# Patient Record
Sex: FEMALE | Race: WHITE | NOT HISPANIC OR LATINO
[De-identification: names, ages, dates, MRNs, and addresses within clinical notes are randomized per-mention and may not be internally consistent; named-entity substitution may affect disease eponyms.]

---

## 2017-03-31 ENCOUNTER — APPOINTMENT (OUTPATIENT)
Dept: ENDOCRINOLOGY | Facility: CLINIC | Age: 52
End: 2017-03-31

## 2017-03-31 VITALS
DIASTOLIC BLOOD PRESSURE: 80 MMHG | HEIGHT: 66 IN | BODY MASS INDEX: 37.61 KG/M2 | OXYGEN SATURATION: 97 % | WEIGHT: 234 LBS | SYSTOLIC BLOOD PRESSURE: 120 MMHG | HEART RATE: 81 BPM

## 2017-03-31 DIAGNOSIS — E04.1 NONTOXIC SINGLE THYROID NODULE: ICD-10-CM

## 2017-03-31 DIAGNOSIS — E66.01 MORBID (SEVERE) OBESITY DUE TO EXCESS CALORIES: ICD-10-CM

## 2017-03-31 LAB — HBA1C MFR BLD HPLC: 5.5 %

## 2017-04-04 LAB
25(OH)D3 SERPL-MCNC: 32.6 NG/ML
ALBUMIN SERPL ELPH-MCNC: 4.2 G/DL
ALP BLD-CCNC: 76 U/L
ALT SERPL-CCNC: 28 U/L
ANION GAP SERPL CALC-SCNC: 13 MMOL/L
AST SERPL-CCNC: 21 U/L
BILIRUB SERPL-MCNC: 0.4 MG/DL
BUN SERPL-MCNC: 8 MG/DL
CALCIUM SERPL-MCNC: 9.1 MG/DL
CHLORIDE SERPL-SCNC: 101 MMOL/L
CO2 SERPL-SCNC: 27 MMOL/L
CREAT SERPL-MCNC: 0.6 MG/DL
GLUCOSE SERPL-MCNC: 91 MG/DL
POTASSIUM SERPL-SCNC: 4.2 MMOL/L
PROT SERPL-MCNC: 6.5 G/DL
SODIUM SERPL-SCNC: 141 MMOL/L
T4 FREE SERPL-MCNC: 1 NG/DL
THYROGLOB AB SERPL-ACNC: <20 IU/ML
THYROPEROXIDASE AB SERPL IA-ACNC: 22 IU/ML
TSH SERPL-ACNC: 4.28 UIU/ML

## 2017-07-17 ENCOUNTER — FORM ENCOUNTER (OUTPATIENT)
Age: 52
End: 2017-07-17

## 2017-07-18 ENCOUNTER — APPOINTMENT (OUTPATIENT)
Dept: MAMMOGRAPHY | Facility: CLINIC | Age: 52
End: 2017-07-18

## 2017-07-18 ENCOUNTER — OUTPATIENT (OUTPATIENT)
Dept: OUTPATIENT SERVICES | Facility: HOSPITAL | Age: 52
LOS: 1 days | End: 2017-07-18
Payer: COMMERCIAL

## 2017-07-18 DIAGNOSIS — Z00.8 ENCOUNTER FOR OTHER GENERAL EXAMINATION: ICD-10-CM

## 2017-07-18 PROCEDURE — 77063 BREAST TOMOSYNTHESIS BI: CPT

## 2017-07-18 PROCEDURE — 77067 SCR MAMMO BI INCL CAD: CPT

## 2017-07-31 ENCOUNTER — APPOINTMENT (OUTPATIENT)
Dept: OBGYN | Facility: CLINIC | Age: 52
End: 2017-07-31
Payer: COMMERCIAL

## 2017-07-31 VITALS
DIASTOLIC BLOOD PRESSURE: 70 MMHG | SYSTOLIC BLOOD PRESSURE: 120 MMHG | WEIGHT: 233 LBS | BODY MASS INDEX: 37.45 KG/M2 | HEIGHT: 66 IN

## 2017-07-31 DIAGNOSIS — Z80.49 FAMILY HISTORY OF MALIGNANT NEOPLASM OF OTHER GENITAL ORGANS: ICD-10-CM

## 2017-07-31 DIAGNOSIS — Z80.0 FAMILY HISTORY OF MALIGNANT NEOPLASM OF DIGESTIVE ORGANS: ICD-10-CM

## 2017-07-31 PROCEDURE — 99396 PREV VISIT EST AGE 40-64: CPT

## 2018-07-25 PROBLEM — Z80.0 FAMILY HISTORY OF COLON CANCER: Status: ACTIVE | Noted: 2017-07-31

## 2018-11-07 ENCOUNTER — APPOINTMENT (OUTPATIENT)
Dept: OBGYN | Facility: CLINIC | Age: 53
End: 2018-11-07
Payer: COMMERCIAL

## 2018-11-07 VITALS
DIASTOLIC BLOOD PRESSURE: 70 MMHG | WEIGHT: 230 LBS | HEIGHT: 66 IN | BODY MASS INDEX: 36.96 KG/M2 | SYSTOLIC BLOOD PRESSURE: 130 MMHG

## 2018-11-07 DIAGNOSIS — Z01.419 ENCOUNTER FOR GYNECOLOGICAL EXAMINATION (GENERAL) (ROUTINE) W/OUT ABNORMAL FINDINGS: ICD-10-CM

## 2018-11-07 PROCEDURE — 99396 PREV VISIT EST AGE 40-64: CPT

## 2018-11-07 PROCEDURE — 99213 OFFICE O/P EST LOW 20 MIN: CPT

## 2018-11-08 LAB — HPV HIGH+LOW RISK DNA PNL CVX: NOT DETECTED

## 2018-11-11 ENCOUNTER — FORM ENCOUNTER (OUTPATIENT)
Age: 53
End: 2018-11-11

## 2018-11-12 ENCOUNTER — APPOINTMENT (OUTPATIENT)
Dept: MAMMOGRAPHY | Facility: CLINIC | Age: 53
End: 2018-11-12
Payer: COMMERCIAL

## 2018-11-12 ENCOUNTER — APPOINTMENT (OUTPATIENT)
Dept: ULTRASOUND IMAGING | Facility: CLINIC | Age: 53
End: 2018-11-12
Payer: COMMERCIAL

## 2018-11-12 ENCOUNTER — OUTPATIENT (OUTPATIENT)
Dept: OUTPATIENT SERVICES | Facility: HOSPITAL | Age: 53
LOS: 1 days | End: 2018-11-12
Payer: COMMERCIAL

## 2018-11-12 DIAGNOSIS — Z00.8 ENCOUNTER FOR OTHER GENERAL EXAMINATION: ICD-10-CM

## 2018-11-12 PROCEDURE — 76830 TRANSVAGINAL US NON-OB: CPT | Mod: 26

## 2018-11-12 PROCEDURE — 77067 SCR MAMMO BI INCL CAD: CPT | Mod: 26

## 2018-11-12 PROCEDURE — 76641 ULTRASOUND BREAST COMPLETE: CPT

## 2018-11-12 PROCEDURE — 76856 US EXAM PELVIC COMPLETE: CPT | Mod: 26

## 2018-11-12 PROCEDURE — 77067 SCR MAMMO BI INCL CAD: CPT

## 2018-11-12 PROCEDURE — 76641 ULTRASOUND BREAST COMPLETE: CPT | Mod: 26,50

## 2018-11-12 PROCEDURE — 76830 TRANSVAGINAL US NON-OB: CPT

## 2018-11-12 PROCEDURE — 77063 BREAST TOMOSYNTHESIS BI: CPT | Mod: 26

## 2018-11-12 PROCEDURE — 77063 BREAST TOMOSYNTHESIS BI: CPT

## 2018-11-12 PROCEDURE — 76856 US EXAM PELVIC COMPLETE: CPT

## 2018-11-13 ENCOUNTER — TRANSCRIPTION ENCOUNTER (OUTPATIENT)
Age: 53
End: 2018-11-13

## 2018-11-13 LAB — CYTOLOGY CVX/VAG DOC THIN PREP: NORMAL

## 2018-12-07 ENCOUNTER — APPOINTMENT (OUTPATIENT)
Dept: OBGYN | Facility: CLINIC | Age: 53
End: 2018-12-07
Payer: COMMERCIAL

## 2018-12-07 VITALS — SYSTOLIC BLOOD PRESSURE: 140 MMHG | DIASTOLIC BLOOD PRESSURE: 90 MMHG

## 2018-12-07 DIAGNOSIS — N95.0 POSTMENOPAUSAL BLEEDING: ICD-10-CM

## 2018-12-07 PROCEDURE — 58558Z: CUSTOM

## 2018-12-13 LAB — CORE LAB BIOPSY: NORMAL

## 2019-04-09 ENCOUNTER — APPOINTMENT (OUTPATIENT)
Dept: RHEUMATOLOGY | Facility: CLINIC | Age: 54
End: 2019-04-09
Payer: COMMERCIAL

## 2019-04-09 VITALS
SYSTOLIC BLOOD PRESSURE: 130 MMHG | DIASTOLIC BLOOD PRESSURE: 80 MMHG | HEART RATE: 69 BPM | BODY MASS INDEX: 36.48 KG/M2 | WEIGHT: 227 LBS | HEIGHT: 66 IN | OXYGEN SATURATION: 98 %

## 2019-04-09 DIAGNOSIS — M79.642 PAIN IN RIGHT HAND: ICD-10-CM

## 2019-04-09 DIAGNOSIS — M79.641 PAIN IN RIGHT HAND: ICD-10-CM

## 2019-04-09 DIAGNOSIS — L40.9 PSORIASIS, UNSPECIFIED: ICD-10-CM

## 2019-04-09 PROCEDURE — 99244 OFF/OP CNSLTJ NEW/EST MOD 40: CPT

## 2019-04-10 ENCOUNTER — MEDICATION RENEWAL (OUTPATIENT)
Age: 54
End: 2019-04-10

## 2019-04-10 NOTE — ASSESSMENT
[FreeTextEntry1] : 55 y/o woman with hx including GERD, seasonal allergies, HTN, last seen in our office 3/3/2016 by Dr. Esparza, with hx of psoriasis/psoriatic arthritis, presents for new patient visit.  At that time 3 years ago, she had not started treatment with Dr. Esparza, wanted to implement lifestyle changes.  Her symptoms in joints/tendons have progressed, and she is more open to starting oral treatment which would benefit both her psoriasis and psoriatic arthritis.  \par \par Plan:\par -XRs b/L hands\par -Obtain imaging of right ankle/knee from [facility]. \par -Otezla samples given, and will Rx Otezla 30mg BID with script sent to her specialty pharmacy.  Risks and benefits of otezla discussed, including but not limited to weight loss, GI upset/diarrhea, new or worsening depression/suicidal ideation.  All questions were answered.  \par -baseline labs today, and again at 1 month on otezla.  \par -f/u in 2-3 months

## 2019-04-10 NOTE — HISTORY OF PRESENT ILLNESS
[FreeTextEntry1] : This is a 53 y/o woman with hx of cholecystitis, lap band in 2008, hx of GERD, seasonal allergies, HTN, presents for joint pain, psoriasis rash, and tendon pain.\par \par She has hooks in thumbs, joint pain  in knuckles, wrists, knees, hips, feet, ankles.\par She has been having these pains consistently for 3 years.  \par Her AM stiffness is 4-5 minutes.  Her pain level is 8 on her worst day.  She will occasionally take an advil on bad days.  Currently takes it about 1-2x/week.\par Hasn't seen overt swelling, but feels fullness in hands.  \par She saw Dr. Esparza 3/2016, and had considered treatment then, but preferred natural management. \par \par She tried avoiding certain foods, exercise.  However, things progressed.  \par \par The psoriasis is on her legs/fee.  The psoriasis has overall worsened over time.  \par \par She has nasal congestion.  \par She as hx of heart murmur and PVCs which is why she's on coreg.  She occ has heart racing.  \par She saw a pulmonologist for persistent cough last year, but that is gone.  \par \par No hx of IBD.\par No hx of uveitis/iritis/scleritis\par No hx of depression.\par \par Fam hx + for thyroid CA.\par \par She had an MRI right ankle and right knee [facility].  \par

## 2019-04-10 NOTE — CONSULT LETTER
[Consult Letter:] : I had the pleasure of evaluating your patient, [unfilled]. [Dear  ___] : Dear  [unfilled], [Please see my note below.] : Please see my note below. [Consult Closing:] : Thank you very much for allowing me to participate in the care of this patient.  If you have any questions, please do not hesitate to contact me. [Sincerely,] : Sincerely, [FreeTextEntry3] : Emilia Michael MD

## 2019-04-11 ENCOUNTER — MEDICATION RENEWAL (OUTPATIENT)
Age: 54
End: 2019-04-11

## 2019-07-11 ENCOUNTER — APPOINTMENT (OUTPATIENT)
Dept: RHEUMATOLOGY | Facility: CLINIC | Age: 54
End: 2019-07-11

## 2019-10-25 RX ORDER — AZTREONAM 2 G
1 VIAL (EA) INJECTION
Qty: 0 | Refills: 0 | DISCHARGE
Start: 2019-10-25 | End: 2019-10-29

## 2019-10-27 RX ORDER — CIPROFLOXACIN LACTATE 400MG/40ML
1 VIAL (ML) INTRAVENOUS
Qty: 0 | Refills: 0 | DISCHARGE
Start: 2019-10-27 | End: 2019-11-02

## 2019-10-28 ENCOUNTER — INPATIENT (INPATIENT)
Facility: HOSPITAL | Age: 54
LOS: 4 days | Discharge: HOME | DRG: 871 | End: 2019-11-02
Attending: INTERNAL MEDICINE | Admitting: HOSPITALIST
Payer: COMMERCIAL

## 2019-10-28 VITALS — HEIGHT: 66 IN | WEIGHT: 225.97 LBS

## 2019-10-28 DIAGNOSIS — N39.0 URINARY TRACT INFECTION, SITE NOT SPECIFIED: ICD-10-CM

## 2019-10-28 LAB
ALBUMIN SERPL ELPH-MCNC: 2.5 G/DL — LOW (ref 3.3–5)
ALP SERPL-CCNC: 139 U/L — HIGH (ref 40–120)
ALT FLD-CCNC: 34 U/L — SIGNIFICANT CHANGE UP (ref 12–78)
ANION GAP SERPL CALC-SCNC: 10 MMOL/L — SIGNIFICANT CHANGE UP (ref 5–17)
APPEARANCE UR: ABNORMAL
APTT BLD: 30.2 SEC — SIGNIFICANT CHANGE UP (ref 27.5–36.3)
AST SERPL-CCNC: 18 U/L — SIGNIFICANT CHANGE UP (ref 15–37)
BASOPHILS # BLD AUTO: 0.05 K/UL — SIGNIFICANT CHANGE UP (ref 0–0.2)
BASOPHILS NFR BLD AUTO: 0.3 % — SIGNIFICANT CHANGE UP (ref 0–2)
BILIRUB SERPL-MCNC: 0.8 MG/DL — SIGNIFICANT CHANGE UP (ref 0.2–1.2)
BILIRUB UR-MCNC: NEGATIVE — SIGNIFICANT CHANGE UP
BUN SERPL-MCNC: 33 MG/DL — HIGH (ref 7–23)
CALCIUM SERPL-MCNC: 8.5 MG/DL — SIGNIFICANT CHANGE UP (ref 8.5–10.1)
CHLORIDE SERPL-SCNC: 98 MMOL/L — SIGNIFICANT CHANGE UP (ref 96–108)
CO2 SERPL-SCNC: 24 MMOL/L — SIGNIFICANT CHANGE UP (ref 22–31)
COLOR SPEC: ABNORMAL
CREAT SERPL-MCNC: 1.89 MG/DL — HIGH (ref 0.5–1.3)
DIFF PNL FLD: ABNORMAL
EOSINOPHIL # BLD AUTO: 0.04 K/UL — SIGNIFICANT CHANGE UP (ref 0–0.5)
EOSINOPHIL NFR BLD AUTO: 0.2 % — SIGNIFICANT CHANGE UP (ref 0–6)
GLUCOSE SERPL-MCNC: 100 MG/DL — HIGH (ref 70–99)
GLUCOSE UR QL: NEGATIVE MG/DL — SIGNIFICANT CHANGE UP
HCT VFR BLD CALC: 38.8 % — SIGNIFICANT CHANGE UP (ref 34.5–45)
HGB BLD-MCNC: 13.7 G/DL — SIGNIFICANT CHANGE UP (ref 11.5–15.5)
IMM GRANULOCYTES NFR BLD AUTO: 1.5 % — SIGNIFICANT CHANGE UP (ref 0–1.5)
INR BLD: 1.6 RATIO — HIGH (ref 0.88–1.16)
KETONES UR-MCNC: ABNORMAL
LACTATE SERPL-SCNC: 0.9 MMOL/L — SIGNIFICANT CHANGE UP (ref 0.7–2)
LEUKOCYTE ESTERASE UR-ACNC: ABNORMAL
LYMPHOCYTES # BLD AUTO: 0.91 K/UL — LOW (ref 1–3.3)
LYMPHOCYTES # BLD AUTO: 5.1 % — LOW (ref 13–44)
MCHC RBC-ENTMCNC: 30 PG — SIGNIFICANT CHANGE UP (ref 27–34)
MCHC RBC-ENTMCNC: 35.3 GM/DL — SIGNIFICANT CHANGE UP (ref 32–36)
MCV RBC AUTO: 84.9 FL — SIGNIFICANT CHANGE UP (ref 80–100)
MONOCYTES # BLD AUTO: 1.15 K/UL — HIGH (ref 0–0.9)
MONOCYTES NFR BLD AUTO: 6.5 % — SIGNIFICANT CHANGE UP (ref 2–14)
NEUTROPHILS # BLD AUTO: 15.35 K/UL — HIGH (ref 1.8–7.4)
NEUTROPHILS NFR BLD AUTO: 86.4 % — HIGH (ref 43–77)
NITRITE UR-MCNC: NEGATIVE — SIGNIFICANT CHANGE UP
PH UR: 5 — SIGNIFICANT CHANGE UP (ref 5–8)
PLATELET # BLD AUTO: 203 K/UL — SIGNIFICANT CHANGE UP (ref 150–400)
POTASSIUM SERPL-MCNC: 3.6 MMOL/L — SIGNIFICANT CHANGE UP (ref 3.5–5.3)
POTASSIUM SERPL-SCNC: 3.6 MMOL/L — SIGNIFICANT CHANGE UP (ref 3.5–5.3)
PROT SERPL-MCNC: 7.2 GM/DL — SIGNIFICANT CHANGE UP (ref 6–8.3)
PROT UR-MCNC: 100 MG/DL
PROTHROM AB SERPL-ACNC: 18 SEC — HIGH (ref 10–12.9)
RBC # BLD: 4.57 M/UL — SIGNIFICANT CHANGE UP (ref 3.8–5.2)
RBC # FLD: 11.8 % — SIGNIFICANT CHANGE UP (ref 10.3–14.5)
SODIUM SERPL-SCNC: 132 MMOL/L — LOW (ref 135–145)
SP GR SPEC: 1.01 — SIGNIFICANT CHANGE UP (ref 1.01–1.02)
UROBILINOGEN FLD QL: NEGATIVE MG/DL — SIGNIFICANT CHANGE UP
WBC # BLD: 17.77 K/UL — HIGH (ref 3.8–10.5)
WBC # FLD AUTO: 17.77 K/UL — HIGH (ref 3.8–10.5)

## 2019-10-28 PROCEDURE — 71046 X-RAY EXAM CHEST 2 VIEWS: CPT | Mod: 26

## 2019-10-28 PROCEDURE — 76770 US EXAM ABDO BACK WALL COMP: CPT

## 2019-10-28 PROCEDURE — 74176 CT ABD & PELVIS W/O CONTRAST: CPT

## 2019-10-28 PROCEDURE — 84100 ASSAY OF PHOSPHORUS: CPT

## 2019-10-28 PROCEDURE — 36415 COLL VENOUS BLD VENIPUNCTURE: CPT

## 2019-10-28 PROCEDURE — 83735 ASSAY OF MAGNESIUM: CPT

## 2019-10-28 PROCEDURE — 86803 HEPATITIS C AB TEST: CPT

## 2019-10-28 PROCEDURE — 93010 ELECTROCARDIOGRAM REPORT: CPT

## 2019-10-28 PROCEDURE — 87493 C DIFF AMPLIFIED PROBE: CPT

## 2019-10-28 PROCEDURE — 85652 RBC SED RATE AUTOMATED: CPT

## 2019-10-28 PROCEDURE — 85025 COMPLETE CBC W/AUTO DIFF WBC: CPT

## 2019-10-28 PROCEDURE — 85027 COMPLETE CBC AUTOMATED: CPT

## 2019-10-28 PROCEDURE — 80048 BASIC METABOLIC PNL TOTAL CA: CPT

## 2019-10-28 PROCEDURE — 80061 LIPID PANEL: CPT

## 2019-10-28 PROCEDURE — 87507 IADNA-DNA/RNA PROBE TQ 12-25: CPT

## 2019-10-28 RX ORDER — OMEGA-3 ACID ETHYL ESTERS 1 G
1 CAPSULE ORAL
Qty: 0 | Refills: 0 | DISCHARGE

## 2019-10-28 RX ORDER — ACETAMINOPHEN 500 MG
650 TABLET ORAL ONCE
Refills: 0 | Status: COMPLETED | OUTPATIENT
Start: 2019-10-28 | End: 2019-10-28

## 2019-10-28 RX ORDER — ONDANSETRON 8 MG/1
4 TABLET, FILM COATED ORAL EVERY 6 HOURS
Refills: 0 | Status: DISCONTINUED | OUTPATIENT
Start: 2019-10-28 | End: 2019-11-02

## 2019-10-28 RX ORDER — ACETAMINOPHEN 500 MG
650 TABLET ORAL EVERY 4 HOURS
Refills: 0 | Status: DISCONTINUED | OUTPATIENT
Start: 2019-10-28 | End: 2019-11-02

## 2019-10-28 RX ORDER — LEVOTHYROXINE SODIUM 125 MCG
1 TABLET ORAL
Qty: 0 | Refills: 0 | DISCHARGE

## 2019-10-28 RX ORDER — MONTELUKAST 4 MG/1
1 TABLET, CHEWABLE ORAL
Qty: 0 | Refills: 0 | DISCHARGE

## 2019-10-28 RX ORDER — CARVEDILOL PHOSPHATE 80 MG/1
1 CAPSULE, EXTENDED RELEASE ORAL
Qty: 0 | Refills: 0 | DISCHARGE

## 2019-10-28 RX ORDER — GABAPENTIN 400 MG/1
300 CAPSULE ORAL
Refills: 0 | Status: DISCONTINUED | OUTPATIENT
Start: 2019-10-28 | End: 2019-11-02

## 2019-10-28 RX ORDER — IBUPROFEN 200 MG
600 TABLET ORAL ONCE
Refills: 0 | Status: COMPLETED | OUTPATIENT
Start: 2019-10-28 | End: 2019-10-28

## 2019-10-28 RX ORDER — CEFTRIAXONE 500 MG/1
1000 INJECTION, POWDER, FOR SOLUTION INTRAMUSCULAR; INTRAVENOUS EVERY 24 HOURS
Refills: 0 | Status: DISCONTINUED | OUTPATIENT
Start: 2019-10-29 | End: 2019-10-31

## 2019-10-28 RX ORDER — CEFTRIAXONE 500 MG/1
1000 INJECTION, POWDER, FOR SOLUTION INTRAMUSCULAR; INTRAVENOUS ONCE
Refills: 0 | Status: DISCONTINUED | OUTPATIENT
Start: 2019-10-28 | End: 2019-10-28

## 2019-10-28 RX ORDER — GABAPENTIN 400 MG/1
1 CAPSULE ORAL
Qty: 0 | Refills: 0 | DISCHARGE

## 2019-10-28 RX ORDER — SODIUM CHLORIDE 9 MG/ML
3000 INJECTION INTRAMUSCULAR; INTRAVENOUS; SUBCUTANEOUS ONCE
Refills: 0 | Status: COMPLETED | OUTPATIENT
Start: 2019-10-28 | End: 2019-10-28

## 2019-10-28 RX ORDER — MONTELUKAST 4 MG/1
10 TABLET, CHEWABLE ORAL DAILY
Refills: 0 | Status: DISCONTINUED | OUTPATIENT
Start: 2019-10-28 | End: 2019-11-02

## 2019-10-28 RX ORDER — LEVOTHYROXINE SODIUM 125 MCG
25 TABLET ORAL DAILY
Refills: 0 | Status: DISCONTINUED | OUTPATIENT
Start: 2019-10-28 | End: 2019-11-02

## 2019-10-28 RX ORDER — CARVEDILOL PHOSPHATE 80 MG/1
6.25 CAPSULE, EXTENDED RELEASE ORAL EVERY 12 HOURS
Refills: 0 | Status: DISCONTINUED | OUTPATIENT
Start: 2019-10-28 | End: 2019-11-02

## 2019-10-28 RX ORDER — CEFTRIAXONE 500 MG/1
1000 INJECTION, POWDER, FOR SOLUTION INTRAMUSCULAR; INTRAVENOUS ONCE
Refills: 0 | Status: COMPLETED | OUTPATIENT
Start: 2019-10-28 | End: 2019-10-28

## 2019-10-28 RX ADMIN — SODIUM CHLORIDE 3000 MILLILITER(S): 9 INJECTION INTRAMUSCULAR; INTRAVENOUS; SUBCUTANEOUS at 18:45

## 2019-10-28 RX ADMIN — CEFTRIAXONE 1000 MILLIGRAM(S): 500 INJECTION, POWDER, FOR SOLUTION INTRAMUSCULAR; INTRAVENOUS at 17:44

## 2019-10-28 RX ADMIN — Medication 650 MILLIGRAM(S): at 17:44

## 2019-10-28 RX ADMIN — Medication 600 MILLIGRAM(S): at 17:45

## 2019-10-28 RX ADMIN — SODIUM CHLORIDE 3000 MILLILITER(S): 9 INJECTION INTRAMUSCULAR; INTRAVENOUS; SUBCUTANEOUS at 17:45

## 2019-10-28 NOTE — ED STATDOCS - PROGRESS NOTE DETAILS
Wojciech RODRIGUEZ for ED attending, Dr. Parisi: 53 yo female p/w CC UTI. Pt has been diagnosed with UTI 4 days ago since having +dysuria. Has been prescribed abx and says no relief of sx. Woke up with +fever this morning. Notes +nausea, +diarrhea and +decreased PO intake. Pt is tachy and febrile and failed outpatient UTI tx. Will send pt to main ED for further evaluation.

## 2019-10-28 NOTE — H&P ADULT - NSHPREVIEWOFSYSTEMS_GEN_ALL_CORE
REVIEW OF SYSTEMS:    CONSTITUTIONAL: No weakness, fevers or chills  EYES/ENT: No visual changes;  No vertigo or throat pain   NECK: No pain or stiffness  RESPIRATORY: No cough, wheezing, hemoptysis; No shortness of breath  CARDIOVASCULAR: No chest pain or palpitations  GASTROINTESTINAL: No abdominal or epigastric pain. No nausea, vomiting, or hematemesis; No diarrhea or constipation. No melena or hematochezia.  GENITOURINARY: +dysuria, frequency. No hematuria  NEUROLOGICAL: No numbness or weakness  SKIN: No itching, burning, rashes, or lesions   All other review of systems is negative unless indicated above

## 2019-10-28 NOTE — H&P ADULT - HISTORY OF PRESENT ILLNESS
55 yo F with pmh HTN, hypothyroidism, morbid obesity s/p gastric sleeve, psoriatic arthritis, GERD recently diagnosed with UTI by her PCP 5 days pta and started on bactrim now p/w SP pain, dysuria, fevers to 103.8, rigors and chills. Patient admittedly completed 3 days of bactrim and felt her condition was worsening after fevers developed along with abovementioned symptoms. She returned to PCP and bactrim was rotated to cipro. She completed one full day of cipro. Her PCP advised her to come to ED after she saw no improvement.   +fevers/chills. No flank pain. No hematuria. No N/V however having diffuse watery diarrhea TNTC since yesterday. No abd pain.   WBC 17. Lactate wnl. was cultured and started on ceftriaxone in ED.   She is presently stable and comfortable    Social: social etoh, no tobacco or drugs  Shx: Lap band, cholecystectomy

## 2019-10-28 NOTE — ED PROVIDER NOTE - CARE PLAN
Principal Discharge DX:	Urinary tract infection with hematuria, site unspecified  Secondary Diagnosis:	Fever, unspecified fever cause  Secondary Diagnosis:	Rigors  Secondary Diagnosis:	Sepsis, due to unspecified organism, unspecified whether acute organ dysfunction present

## 2019-10-28 NOTE — ED PROVIDER NOTE - CLINICAL SUMMARY MEDICAL DECISION MAKING FREE TEXT BOX
Fever, chills, rigors, failure to treat with abx, outpatient UTI, at high risk of pyelonephritis, and sepsis. Will admit for abx IV treatment and care. It would be unsafe to dc patient home.

## 2019-10-28 NOTE — ED PROVIDER NOTE - NS_ ATTENDINGSCRIBEDETAILS _ED_A_ED_FT
I Anjel Walden MD saw and examined the patient. Scribe documented for me and under my supervision. I have modified the scribe's documentation where necessary to reflect my history, physical exam and other relevant documentations pertinent to the care of the patient.

## 2019-10-28 NOTE — ED PROVIDER NOTE - OBJECTIVE STATEMENT
53 y/o f presenting to the ED c/o UTI. Pt has diagnosed with UTI x4 days ago since having +dysuria. Has been prescribed abx with no relief of sx. Woke up with +fever this morning. Also notes +nausea, +diarrhea and +decreased PO intake. 55 y/o f with PMHx of HTN presenting to the ED c/o UTI. Pt was diagnosed with UTI x4 days ago by Dr. Samson Hidalgo s/p +dysuria. Has been prescribed Bactrim with no relief of sx, changed to Cipro today with slight relief, recommended she come to ER. Woke up with +fever, +chills this morning. Also notes +nausea, +diarrhea and +decreased PO intake. Nonsmoker, drinks alcohol socially, no recreational drug use. 53 y/o female with PMHx of HTN presenting to the ED c/o UTI. Pt was diagnosed with UTI x4 days ago by Dr. Samson Hidalgo s/p +dysuria. Has been prescribed Bactrim with no relief of sx, changed to Cipro today with slight relief, recommended she come to ER. Woke up with +fever, +chills this morning. Also notes +nausea, +diarrhea and +decreased PO intake. Nonsmoker, drinks alcohol socially, no recreational drug use.

## 2019-10-28 NOTE — ED ADULT TRIAGE NOTE - CHIEF COMPLAINT QUOTE
pt presents to ED c/o back pain, fever, UTI. pt diagnosed w/ UTI Friday and put on bactrim which was then switched to cipro w/ no relief. denies hematuria. TMax 103.8, has not taken tylenol since last night

## 2019-10-28 NOTE — H&P ADULT - NSHPPHYSICALEXAM_GEN_ALL_CORE
ICU Vital Signs Last 24 Hrs  T(C): 36.8 (28 Oct 2019 20:50), Max: 38.8 (28 Oct 2019 16:32)  T(F): 98.3 (28 Oct 2019 20:50), Max: 101.8 (28 Oct 2019 16:32)  HR: 80 (28 Oct 2019 20:50) (80 - 110)  BP: 118/58 (28 Oct 2019 20:50) (100/55 - 148/60)  BP(mean): --  ABP: --  ABP(mean): --  RR: 16 (28 Oct 2019 20:50) (16 - 18)  SpO2: 98% (28 Oct 2019 20:50) (98% - 100%)      PHYSICAL EXAM:    Constitutional: NAD, awake and alert, well-developed  HEENT: PERR, EOMI, Normal Hearing, MMM  Neck: Soft and supple, No LAD, No JVD  Respiratory: Breath sounds are clear bilaterally, No wheezing, rales or rhonchi  Cardiovascular: S1 and S2, regular rate and rhythm, no Murmurs, gallops or rubs  Gastrointestinal: Bowel Sounds present, soft, nontender, nondistended, no guarding, no rebound  : SP on palpations; no flank tenderness  Extremities: No peripheral edema  Vascular: 2+ peripheral pulses  Neurological: A/O x 3, no focal deficits  Musculoskeletal: 5/5 strength b/l upper and lower extremities  Skin: No rashes

## 2019-10-28 NOTE — H&P ADULT - NSHPLABSRESULTS_GEN_ALL_CORE
LABS: All Labs Reviewed:                        13.7   17.77 )-----------( 203      ( 28 Oct 2019 17:39 )             38.8     10-28    132<L>  |  98  |  33<H>  ----------------------------<  100<H>  3.6   |  24  |  1.89<H>    Ca    8.5      28 Oct 2019 17:39    TPro  7.2  /  Alb  2.5<L>  /  TBili  0.8  /  DBili  x   /  AST  18  /  ALT  34  /  AlkPhos  139<H>  10-28    PT/INR - ( 28 Oct 2019 17:39 )   PT: 18.0 sec;   INR: 1.60 ratio         PTT - ( 28 Oct 2019 17:39 )  PTT:30.2 sec          Blood Culture:

## 2019-10-28 NOTE — ED ADULT NURSE NOTE - OBJECTIVE STATEMENT
pt presents to ED c/o UTI. diagnosed a week ago and placed on bactrim and pt states her fever and symptoms became higher, tmax of 103F orally. recently changed meds and placed on cipro. states she has been putting off coming but states nothing has been working. states she was also recently on a prednisone taper pack for swelling in her foot last week. prednisone was finished and foot swelling resolved. ST otm, all other VS WNL. family at bedside. pt able to speak full sentences and ambulate. pt in NAD. RN will ctm

## 2019-10-28 NOTE — ED PROVIDER NOTE - CHPI ED SYMPTOMS POS
DIARRHEA/DECREASED EATING/DRINKING/FEVER/NAUSEA/+dysuria +dysuria, +chills/NAUSEA/DECREASED EATING/DRINKING/DIARRHEA/FEVER

## 2019-10-28 NOTE — ED PROVIDER NOTE - SECONDARY DIAGNOSIS.
Fever, unspecified fever cause Rigors Sepsis, due to unspecified organism, unspecified whether acute organ dysfunction present

## 2019-10-28 NOTE — H&P ADULT - ASSESSMENT
55 yo F with pmh HTN, hypothyroidism, morbid obesity s/p gastric sleeve, psoriatic arthritis, GERD recently diagnosed with UTI by her PCP 5 days pta and started on bactrim now p/w SP pain, dysuria, fevers to 103.8, rigors and chills. Patient admittedly completed 3 days of bactrim and felt her condition was worsening after fevers developed along with abovementioned symptoms. She returned to PCP and bactrim was rotated to cipro. She completed one full day of cipro. Her PCP advised her to come to ED after she saw no improvement.   +fevers/chills. No flank pain. No hematuria. No N/V however having diffuse watery diarrhea TNTC since yesterday. No abd pain.   WBC 17. Lactate wnl. was cultured and started on ceftriaxone in ED.   She is presently stable and comfortable      #Sepsis (POA) 2/2 to UTI - failed outpt treatment  #RICKY/ATN 2/2 to above (unknown baseline)  #Mild Hyponatremia  #diarrhea  -admit to [facility]  -Start ceftriaxone  -Blood / urine cultures  -Anti-pyretics  -NS @ 100 cc/hr  -BMP in am  -If Scr worsen-> renal consult  -Obtain John/UUa - hold hctz/ARB  -renal sono  -Contact isolation  -GI PCR/C diff toxin  DVT px  IMPROVE VTE Individual Risk Assessment    RISK                                                                Points    [  ] Previous VTE                                                  3    [  ] Thrombophilia                                               2    [  ] Lower limb paralysis                                      2        (unable to hold up >15 seconds)      [  ] Current Cancer                                              2         (within 6 months)    [  ] Immobilization > 24 hrs                                1    [  ] ICU/CCU stay > 24 hours                              1    [  ] Age > 60                                                      1    IMPROVE VTE Score ____0_____-> SCDs    IMPROVE Score 0-1: Low Risk, No VTE prophylaxis required for most patients, encourage ambulation.   IMPROVE Score 2-3: At risk, pharmacologic VTE prophylaxis is indicated for most patients (in the absence of a contraindication)  IMPROVE Score > or = 4: High Risk, pharmacologic VTE prophylaxis is indicated for most patients (in the absence of a contraindication)

## 2019-10-29 LAB
ANION GAP SERPL CALC-SCNC: 6 MMOL/L — SIGNIFICANT CHANGE UP (ref 5–17)
BUN SERPL-MCNC: 30 MG/DL — HIGH (ref 7–23)
C DIFF BY PCR RESULT: SIGNIFICANT CHANGE UP
C DIFF TOX GENS STL QL NAA+PROBE: SIGNIFICANT CHANGE UP
CALCIUM SERPL-MCNC: 8.1 MG/DL — LOW (ref 8.5–10.1)
CHLORIDE SERPL-SCNC: 105 MMOL/L — SIGNIFICANT CHANGE UP (ref 96–108)
CO2 SERPL-SCNC: 25 MMOL/L — SIGNIFICANT CHANGE UP (ref 22–31)
CREAT SERPL-MCNC: 1.62 MG/DL — HIGH (ref 0.5–1.3)
CULTURE RESULTS: NO GROWTH — SIGNIFICANT CHANGE UP
CULTURE RESULTS: SIGNIFICANT CHANGE UP
GLUCOSE SERPL-MCNC: 105 MG/DL — HIGH (ref 70–99)
HCT VFR BLD CALC: 36.3 % — SIGNIFICANT CHANGE UP (ref 34.5–45)
HCV AB S/CO SERPL IA: 0.12 S/CO — SIGNIFICANT CHANGE UP (ref 0–0.99)
HCV AB SERPL-IMP: SIGNIFICANT CHANGE UP
HGB BLD-MCNC: 12.4 G/DL — SIGNIFICANT CHANGE UP (ref 11.5–15.5)
MCHC RBC-ENTMCNC: 29.7 PG — SIGNIFICANT CHANGE UP (ref 27–34)
MCHC RBC-ENTMCNC: 34.2 GM/DL — SIGNIFICANT CHANGE UP (ref 32–36)
MCV RBC AUTO: 87.1 FL — SIGNIFICANT CHANGE UP (ref 80–100)
PLATELET # BLD AUTO: 200 K/UL — SIGNIFICANT CHANGE UP (ref 150–400)
POTASSIUM SERPL-MCNC: 3.7 MMOL/L — SIGNIFICANT CHANGE UP (ref 3.5–5.3)
POTASSIUM SERPL-SCNC: 3.7 MMOL/L — SIGNIFICANT CHANGE UP (ref 3.5–5.3)
RBC # BLD: 4.17 M/UL — SIGNIFICANT CHANGE UP (ref 3.8–5.2)
RBC # FLD: 11.9 % — SIGNIFICANT CHANGE UP (ref 10.3–14.5)
SODIUM SERPL-SCNC: 136 MMOL/L — SIGNIFICANT CHANGE UP (ref 135–145)
SPECIMEN SOURCE: SIGNIFICANT CHANGE UP
SPECIMEN SOURCE: SIGNIFICANT CHANGE UP
WBC # BLD: 15.89 K/UL — HIGH (ref 3.8–10.5)
WBC # FLD AUTO: 15.89 K/UL — HIGH (ref 3.8–10.5)

## 2019-10-29 RX ORDER — LACTOBACILLUS ACIDOPHILUS 100MM CELL
1 CAPSULE ORAL
Refills: 0 | Status: DISCONTINUED | OUTPATIENT
Start: 2019-10-29 | End: 2019-11-02

## 2019-10-29 RX ORDER — SODIUM CHLORIDE 9 MG/ML
1000 INJECTION INTRAMUSCULAR; INTRAVENOUS; SUBCUTANEOUS
Refills: 0 | Status: DISCONTINUED | OUTPATIENT
Start: 2019-10-29 | End: 2019-10-30

## 2019-10-29 RX ADMIN — Medication 650 MILLIGRAM(S): at 14:46

## 2019-10-29 RX ADMIN — Medication 650 MILLIGRAM(S): at 15:12

## 2019-10-29 RX ADMIN — Medication 25 MICROGRAM(S): at 06:02

## 2019-10-29 RX ADMIN — Medication 1 TABLET(S): at 15:06

## 2019-10-29 RX ADMIN — CARVEDILOL PHOSPHATE 6.25 MILLIGRAM(S): 80 CAPSULE, EXTENDED RELEASE ORAL at 05:59

## 2019-10-29 RX ADMIN — SODIUM CHLORIDE 75 MILLILITER(S): 9 INJECTION INTRAMUSCULAR; INTRAVENOUS; SUBCUTANEOUS at 15:06

## 2019-10-29 RX ADMIN — MONTELUKAST 10 MILLIGRAM(S): 4 TABLET, CHEWABLE ORAL at 18:13

## 2019-10-29 RX ADMIN — CARVEDILOL PHOSPHATE 6.25 MILLIGRAM(S): 80 CAPSULE, EXTENDED RELEASE ORAL at 18:13

## 2019-10-29 RX ADMIN — CEFTRIAXONE 1000 MILLIGRAM(S): 500 INJECTION, POWDER, FOR SOLUTION INTRAMUSCULAR; INTRAVENOUS at 18:13

## 2019-10-29 RX ADMIN — Medication 650 MILLIGRAM(S): at 22:25

## 2019-10-29 NOTE — PROGRESS NOTE ADULT - ASSESSMENT
#Sepsis (POA) 2/2 to UTI - failed outpt treatment  #RICKY/ATN 2/2 to above (unknown baseline)  #Mild Hyponatremia  #diarrhea  -Start ceftriaxone  -Blood / urine cultures  -Anti-pyretics  -NS @ 100 cc/hr  -BMP in am  -If Scr worsen-> renal consult  -Obtain John/UUa - hold hctz/ARB  -renal sono  -Contact isolation  -GI PCR/C diff toxin    DVT px #Sepsis (POA) 2/2 to UTI - failed outpt treatment  #RICKY/ATN 2/2 to above (unknown baseline)  #Mild Hyponatremia 2ndry to Hypovolemia  #diarrhea mos likely 2ndry to ABX  -GI PCR/C diff toxin  -Start ceftriaxone and Probiotics  -IVF  -FU Blood / urine cultures which will be skewed since patient was on oral ABX prior  -Anti-pyretics  -BMP in am  -Obtain John/UUa - hold hctz/ARB    DVT px

## 2019-10-29 NOTE — PROGRESS NOTE ADULT - SUBJECTIVE AND OBJECTIVE BOX
HOSPITALIST PROGRESS NOTE:  SUBJECTIVE:  PCP:  Chief Complaint: Patient is a 54y old  Female who presents with a chief complaint of fevers, chills rigors (28 Oct 2019 21:18)      HPI:  53 yo F with pmh HTN, hypothyroidism, morbid obesity s/p gastric sleeve, psoriatic arthritis, GERD recently diagnosed with UTI by her PCP 5 days pta and started on bactrim now p/w SP pain, dysuria, fevers to 103.8, rigors and chills. Patient admittedly completed 3 days of bactrim and felt her condition was worsening after fevers developed along with abovementioned symptoms. She returned to PCP and bactrim was rotated to cipro. She completed one full day of cipro. Her PCP advised her to come to ED after she saw no improvement.   +fevers/chills. No flank pain. No hematuria. No N/V however having diffuse watery diarrhea TNTC since yesterday. No abd pain.   WBC 17. Lactate wnl. was cultured and started on ceftriaxone in ED.   She is presently stable and comfortable    10/29:  Above reviewed    Allergies:  No Known Allergies    REVIEW OF SYSTEMS:  See HPI. All other review of systems is negative unless indicated above.     OBJECTIVE  Physical Exam:  Vital Signs:  Height (cm): 167.6 (10- @ 20:50)  Weight (kg): 100.4 (10-28 @ 20:50)  BMI (kg/m2): 35.7 (10- @ 20:50)  BSA (m2): 2.09 (10- @ 20:50)  Vital Signs Last 24 Hrs  T(C): 37.6 (29 Oct 2019 05:25), Max: 38.8 (28 Oct 2019 16:32)  T(F): 99.6 (29 Oct 2019 05:25), Max: 101.8 (28 Oct 2019 16:32)  HR: 98 (29 Oct 2019 05:25) (80 - 110)  BP: 133/45 (29 Oct 2019 05:25) (100/55 - 148/60)  BP(mean): --  RR: 17 (29 Oct 2019 05:25) (16 - 18)  SpO2: 98% (29 Oct 2019 05:25) (98% - 100%)  I&O's Summary      Constitutional: NAD, awake and alert, well-developed  Neurological: AAO x 3, no focal deficits  HEENT: PERRLA, EOMI, MMM  Neck: Soft and supple, No LAD, No JVD  Respiratory: Breath sounds are clear bilaterally, No wheezing, rales or rhonchi  Cardiovascular: S1 and S2, regular rate and rhythm; no Murmurs, gallops or rubs  Gastrointestinal: Bowel Sounds present, soft, nontender, nondistended, no guarding, no rebound tenderness  Back: No CVA tenderness   Extremities: No peripheral edema  Vascular: 2+ peripheral pulses  Musculoskeletal: 5/5 strength b/l upper and lower extremities  Skin: No rashes  Breast: Deferred  Rectal: Deferred    MEDICATIONS  (STANDING):  carvedilol 6.25 milliGRAM(s) Oral every 12 hours  cefTRIAXone Injectable. 1000 milliGRAM(s) IV Push every 24 hours  levothyroxine 25 MICROGram(s) Oral daily  montelukast 10 milliGRAM(s) Oral daily      LABS: All Labs Reviewed:                        12.4   15.89 )-----------( 200      ( 29 Oct 2019 08:16 )             36.3     10-29    136  |  105  |  30<H>  ----------------------------<  105<H>  3.7   |  25  |  1.62<H>    Ca    8.1<L>      29 Oct 2019 08:16    TPro  7.2  /  Alb  2.5<L>  /  TBili  0.8  /  DBili  x   /  AST  18  /  ALT  34  /  AlkPhos  139<H>  10    PT/INR - ( 28 Oct 2019 17:39 )   PT: 18.0 sec;   INR: 1.60 ratio         PTT - ( 28 Oct 2019 17:39 )  PTT:30.2 sec        Urinalysis Basic - ( 28 Oct 2019 17:39 )    Color: Maria Elena / Appearance: Slightly Turbid / S.015 / pH: x  Gluc: x / Ketone: Trace  / Bili: Negative / Urobili: Negative mg/dL   Blood: x / Protein: 100 mg/dL / Nitrite: Negative   Leuk Esterase: Small / RBC: 11-25 /HPF / WBC 11-25   Sq Epi: x / Non Sq Epi: Moderate / Bacteria: Moderate      RADIOLOGY/EKG:    < from: Xray Chest 2 Views PA/Lat (10.28.19 @ 17:48) >    Impression:    No definite focal consolidation    < end of copied text > HOSPITALIST PROGRESS NOTE:  SUBJECTIVE:  PCP:  Chief Complaint: Patient is a 54y old  Female who presents with a chief complaint of fevers, chills rigors (28 Oct 2019 21:18)      HPI:  55 yo F with pmh HTN, hypothyroidism, morbid obesity s/p gastric sleeve, psoriatic arthritis, GERD recently diagnosed with UTI by her PCP 5 days pta and started on bactrim now p/w SP pain, dysuria, fevers to 103.8, rigors and chills. Patient admittedly completed 3 days of bactrim and felt her condition was worsening after fevers developed along with abovementioned symptoms. She returned to PCP and bactrim was rotated to cipro. She completed one full day of cipro. Her PCP advised her to come to ED after she saw no improvement.   +fevers/chills. No flank pain. No hematuria. No N/V however having diffuse watery diarrhea TNTC since yesterday. No abd pain.   WBC 17. Lactate wnl. was cultured and started on ceftriaxone in ED.   She is presently stable and comfortable    10/29:  Above reviewed; Diarrhea less; no abd pain; No overnight events    Allergies:  No Known Allergies    REVIEW OF SYSTEMS:  See HPI. All other review of systems is negative unless indicated above.     OBJECTIVE  Physical Exam:  Vital Signs Last 24 Hrs  T(C): 37.2 (29 Oct 2019 11:12), Max: 38.8 (28 Oct 2019 16:32)  T(F): 98.9 (29 Oct 2019 11:12), Max: 101.8 (28 Oct 2019 16:32)  HR: 87 (29 Oct 2019 11:12) (80 - 110)  BP: 127/53 (29 Oct 2019 11:12) (100/55 - 148/60)  BP(mean): --  RR: 18 (29 Oct 2019 11:12) (16 - 18)  SpO2: 98% (29 Oct 2019 11:12) (98% - 100%)      Constitutional: NAD, awake and alert, Obese   Neurological: AAO x 3, no focal deficits  HEENT: PERRLA, EOMI, MMM  Neck: Soft and supple, No LAD, No JVD  Respiratory: Breath sounds are clear bilaterally, No wheezing, rales or rhonchi  Cardiovascular: S1 and S2, regular rate and rhythm; no Murmurs, gallops or rubs  Gastrointestinal: Bowel Sounds present, soft, nontender, nondistended, no guarding, no rebound tenderness  Back: No CVA tenderness   Extremities: No peripheral edema  Vascular: 2+ peripheral pulses  Musculoskeletal: 5/5 strength b/l upper and lower extremities  Skin: No rashes  Breast: Deferred  Rectal: Deferred    MEDICATIONS  (STANDING):  carvedilol 6.25 milliGRAM(s) Oral every 12 hours  cefTRIAXone Injectable. 1000 milliGRAM(s) IV Push every 24 hours  levothyroxine 25 MICROGram(s) Oral daily  montelukast 10 milliGRAM(s) Oral daily      LABS: All Labs Reviewed:                        12.4   15.89 )-----------( 200      ( 29 Oct 2019 08:16 )             36.3     10-29    136  |  105  |  30<H>  ----------------------------<  105<H>  3.7   |  25  |  1.62<H>    Ca    8.1<L>      29 Oct 2019 08:16    TPro  7.2  /  Alb  2.5<L>  /  TBili  0.8  /  DBili  x   /  AST  18  /  ALT  34  /  AlkPhos  139<H>  10-28    PT/INR - ( 28 Oct 2019 17:39 )   PT: 18.0 sec;   INR: 1.60 ratio         PTT - ( 28 Oct 2019 17:39 )  PTT:30.2 sec        Urinalysis Basic - ( 28 Oct 2019 17:39 )    Color: Maria Elena / Appearance: Slightly Turbid / S.015 / pH: x  Gluc: x / Ketone: Trace  / Bili: Negative / Urobili: Negative mg/dL   Blood: x / Protein: 100 mg/dL / Nitrite: Negative   Leuk Esterase: Small / RBC: 11-25 /HPF / WBC 11-25   Sq Epi: x / Non Sq Epi: Moderate / Bacteria: Moderate      RADIOLOGY/EKG:    < from: Xray Chest 2 Views PA/Lat (10.28.19 @ 17:48) >    Impression:    No definite focal consolidation    < end of copied text >

## 2019-10-29 NOTE — CONSULT NOTE ADULT - SUBJECTIVE AND OBJECTIVE BOX
Patient is a 54y old  Female who presents with a chief complaint of fevers, chills rigors    HPI:  53 y/o Female with h/o HTN, hypothyroidism, morbid obesity s/p gastric sleeve, psoriatic arthritis, GERD recently diagnosed with UTI by her PCP 5 days PTA and treated with oral bactrim, then cipro was admitted on 10/29 for suprapubic pain, dysuria, fevers to 103.8F, rigors and chills. Patient took bactrim for 3 days and felt her condition was worsening with fever. She returned to PCP and was given cipro. She completed one full day of cipro without improvement. Her PCP advised her to come to ED after she saw no improvement.   She has fevers/chills. No flank pain. No hematuria. She has diffuse watery diarrhea since yesterday. No abd pain. In ER she received ceftriaxone.    PMH: as above  Lap band, cholecystectomy  Meds: per reconciliation sheet, noted below  MEDICATIONS  (STANDING):  carvedilol 6.25 milliGRAM(s) Oral every 12 hours  cefTRIAXone Injectable. 1000 milliGRAM(s) IV Push every 24 hours  lactobacillus acidophilus 1 Tablet(s) Oral two times a day  levothyroxine 25 MICROGram(s) Oral daily  montelukast 10 milliGRAM(s) Oral daily  sodium chloride 0.9%. 1000 milliLiter(s) (75 mL/Hr) IV Continuous <Continuous>    MEDICATIONS  (PRN):  acetaminophen   Tablet .. 650 milliGRAM(s) Oral every 4 hours PRN Mild Pain (1 - 3)  aluminum hydroxide/magnesium hydroxide/simethicone Suspension 30 milliLiter(s) Oral every 4 hours PRN Dyspepsia  gabapentin 300 milliGRAM(s) Oral two times a day PRN pain  ondansetron Injectable 4 milliGRAM(s) IV Push every 6 hours PRN Nausea    Allergies    No Known Allergies    Intolerances      Social: no smoking, no alcohol, no illegal drugs; no recent travel, no exposure to TB  FAMILY HISTORY:    no history of premature cardiovascular disease in first degree relatives    ROS: the patient denies fever, no chills, no HA, no seizures, no dizziness, no sore throat, no nasal congestion, no blurry vision, no CP, no palpitations, no SOB, no cough, no abdominal pain, no diarrhea, no N/V, no dysuria, no leg pain, no claudication, no rash, no joint aches, no rectal pain or bleeding, no night sweats  All other systems reviewed and are negative    Vital Signs Last 24 Hrs  T(C): 38.9 (29 Oct 2019 14:45), Max: 38.9 (29 Oct 2019 14:45)  T(F): 102.1 (29 Oct 2019 14:45), Max: 102.1 (29 Oct 2019 14:45)  HR: 87 (29 Oct 2019 11:12) (80 - 110)  BP: 127/53 (29 Oct 2019 11:12) (100/55 - 148/60)  BP(mean): --  RR: 18 (29 Oct 2019 11:12) (16 - 18)  SpO2: 98% (29 Oct 2019 11:12) (98% - 100%)  Daily Height in cm: 167.64 (28 Oct 2019 20:50)    Daily     PE:    Constitutional: frail looking  HEENT: NC/AT, EOMI, PERRLA, conjunctivae clear; ears and nose atraumatic; pharynx benign  Neck: supple; thyroid not palpable  Back: no tenderness  Respiratory: respiratory effort normal; clear to auscultation  Cardiovascular: S1S2 regular, no murmurs  Abdomen: soft, not tender, not distended, positive BS; no liver or spleen organomegaly  Genitourinary: no suprapubic tenderness  Lymphatic: no LN palpable  Musculoskeletal: no muscle tenderness, no joint swelling or tenderness  Extremities: no pedal edema  Neurological/ Psychiatric: AxOx3, judgement and insight normal; moving all extremities  Skin: no rashes; no palpable lesions    Labs: all available labs reviewed                        12.4   15.89 )-----------( 200      ( 29 Oct 2019 08:16 )             36.3     10    136  |  105  |  30<H>  ----------------------------<  105<H>  3.7   |  25  |  1.62<H>    Ca    8.1<L>      29 Oct 2019 08:16    TPro  7.2  /  Alb  2.5<L>  /  TBili  0.8  /  DBili  x   /  AST  18  /  ALT  34  /  AlkPhos  139<H>  10-     LIVER FUNCTIONS - ( 28 Oct 2019 17:39 )  Alb: 2.5 g/dL / Pro: 7.2 gm/dL / ALK PHOS: 139 U/L / ALT: 34 U/L / AST: 18 U/L / GGT: x           Urinalysis Basic - ( 28 Oct 2019 17:39 )    Color: Maria Elena / Appearance: Slightly Turbid / S.015 / pH: x  Gluc: x / Ketone: Trace  / Bili: Negative / Urobili: Negative mg/dL   Blood: x / Protein: 100 mg/dL / Nitrite: Negative   Leuk Esterase: Small / RBC: 11-25 /HPF / WBC 11-25   Sq Epi: x / Non Sq Epi: Moderate / Bacteria: Moderate        Culture Results:   GI PCR Results: NOT detected  *******Please Note:*******  GI panel PCR evaluates for:  Campylobacter, Plesiomonas shigelloides, Salmonella,  Vibrio, Yersinia enterocolitica, Enteroaggregative  Escherichia coli (EAEC), Enteropathogenic E.coli (EPEC),  Enterotoxigenic E. coli (ETEC) lt/st, Shiga-like  toxin-producing E. coli (STEC) stx1/stx2,  Shigella/ Enteroinvasive E. coli (EIEC), Cryptosporidium,  Cyclospora cayetanensis, Entamoeba histolytica,  Giardia lamblia, Adenovirus F 40/41, Astrovirus,  Norovirus GI/GII, Rotavirus A, Sapovirus (10-29 @ 03:15)    Radiology: all available radiological tests reviewed    < from: Xray Chest 2 Views PA/Lat (10.28.19 @ 17:48) >  No definite focal consolidation    < end of copied text >      Advanced directives addressed: full resuscitation Patient is a 54y old  Female who presents with a chief complaint of fevers, chills rigors    HPI:  55 y/o Female with h/o HTN, hypothyroidism, morbid obesity s/p gastric sleeve, psoriatic arthritis, GERD recently diagnosed with UTI by her PCP 5 days PTA and treated with oral bactrim, then cipro was admitted on 10/29 for suprapubic pain, dysuria, fevers to 103.8F, rigors and chills. Patient took bactrim for 3 days and felt her condition was worsening with fever. She returned to PCP and was given cipro. She completed one full day of cipro without improvement. Her PCP advised her to come to ED after she saw no improvement.   She has fevers/chills. She has left flank pain. No hematuria. She has diffuse watery diarrhea since yesterday. No abd pain. In ER she received ceftriaxone.    PMH: as above  Lap band, cholecystectomy  Meds: per reconciliation sheet, noted below  MEDICATIONS  (STANDING):  carvedilol 6.25 milliGRAM(s) Oral every 12 hours  cefTRIAXone Injectable. 1000 milliGRAM(s) IV Push every 24 hours  lactobacillus acidophilus 1 Tablet(s) Oral two times a day  levothyroxine 25 MICROGram(s) Oral daily  montelukast 10 milliGRAM(s) Oral daily  sodium chloride 0.9%. 1000 milliLiter(s) (75 mL/Hr) IV Continuous <Continuous>    MEDICATIONS  (PRN):  acetaminophen   Tablet .. 650 milliGRAM(s) Oral every 4 hours PRN Mild Pain (1 - 3)  aluminum hydroxide/magnesium hydroxide/simethicone Suspension 30 milliLiter(s) Oral every 4 hours PRN Dyspepsia  gabapentin 300 milliGRAM(s) Oral two times a day PRN pain  ondansetron Injectable 4 milliGRAM(s) IV Push every 6 hours PRN Nausea    Allergies    No Known Allergies    Intolerances      Social: no smoking, no alcohol, no illegal drugs; no recent travel, no exposure to TB  FAMILY HISTORY:    no history of premature cardiovascular disease in first degree relatives    ROS: the patient denies HA, no seizures, no dizziness, no sore throat, no nasal congestion, no blurry vision, no CP, no palpitations, no SOB, no cough, no abdominal pain, has diarrhea, no N/V, has dysuria, no leg pain, no claudication, no rash, no joint aches, no rectal pain or bleeding, no night sweats  All other systems reviewed and are negative    Vital Signs Last 24 Hrs  T(C): 38.9 (29 Oct 2019 14:45), Max: 38.9 (29 Oct 2019 14:45)  T(F): 102.1 (29 Oct 2019 14:45), Max: 102.1 (29 Oct 2019 14:45)  HR: 87 (29 Oct 2019 11:12) (80 - 110)  BP: 127/53 (29 Oct 2019 11:12) (100/55 - 148/60)  BP(mean): --  RR: 18 (29 Oct 2019 11:12) (16 - 18)  SpO2: 98% (29 Oct 2019 11:12) (98% - 100%)  Daily Height in cm: 167.64 (28 Oct 2019 20:50)    Daily     PE:    Constitutional: frail looking  HEENT: NC/AT, EOMI, PERRLA, conjunctivae clear; ears and nose atraumatic; pharynx benign  Neck: supple; thyroid not palpable  Back: no tenderness  Respiratory: respiratory effort normal; clear to auscultation  Cardiovascular: S1S2 regular, no murmurs  Abdomen: soft, not tender, not distended, positive BS; no liver or spleen organomegaly  Genitourinary: no suprapubic tenderness; mild left flank pain  Lymphatic: no LN palpable  Musculoskeletal: no muscle tenderness, no joint swelling or tenderness  Extremities: no pedal edema  Neurological/ Psychiatric: AxOx3, judgement and insight normal; moving all extremities  Skin: no rashes; no palpable lesions    Labs: all available labs reviewed                        12.4   15.89 )-----------( 200      ( 29 Oct 2019 08:16 )             36.3     10    136  |  105  |  30<H>  ----------------------------<  105<H>  3.7   |  25  |  1.62<H>    Ca    8.1<L>      29 Oct 2019 08:16    TPro  7.2  /  Alb  2.5<L>  /  TBili  0.8  /  DBili  x   /  AST  18  /  ALT  34  /  AlkPhos  139<H>  10     LIVER FUNCTIONS - ( 28 Oct 2019 17:39 )  Alb: 2.5 g/dL / Pro: 7.2 gm/dL / ALK PHOS: 139 U/L / ALT: 34 U/L / AST: 18 U/L / GGT: x           Urinalysis Basic - ( 28 Oct 2019 17:39 )    Color: Maria Elena / Appearance: Slightly Turbid / S.015 / pH: x  Gluc: x / Ketone: Trace  / Bili: Negative / Urobili: Negative mg/dL   Blood: x / Protein: 100 mg/dL / Nitrite: Negative   Leuk Esterase: Small / RBC: 11-25 /HPF / WBC 11-25   Sq Epi: x / Non Sq Epi: Moderate / Bacteria: Moderate        Culture Results:   GI PCR Results: NOT detected  *******Please Note:*******  GI panel PCR evaluates for:  Campylobacter, Plesiomonas shigelloides, Salmonella,  Vibrio, Yersinia enterocolitica, Enteroaggregative  Escherichia coli (EAEC), Enteropathogenic E.coli (EPEC),  Enterotoxigenic E. coli (ETEC) lt/st, Shiga-like  toxin-producing E. coli (STEC) stx1/stx2,  Shigella/ Enteroinvasive E. coli (EIEC), Cryptosporidium,  Cyclospora cayetanensis, Entamoeba histolytica,  Giardia lamblia, Adenovirus F 40/41, Astrovirus,  Norovirus GI/GII, Rotavirus A, Sapovirus (10-29 @ 03:15)    Radiology: all available radiological tests reviewed    < from: Xray Chest 2 Views PA/Lat (10.28.19 @ 17:48) >  No definite focal consolidation    < end of copied text >      Advanced directives addressed: full resuscitation

## 2019-10-29 NOTE — CONSULT NOTE ADULT - ASSESSMENT
53 y/o Female with h/o HTN, hypothyroidism, morbid obesity s/p gastric sleeve, psoriatic arthritis, GERD recently diagnosed with UTI by her PCP 5 days PTA and treated with oral bactrim, then cipro was admitted on 10/29 for suprapubic pain, dysuria, fevers to 103.8F, rigors and chills. Patient took bactrim for 3 days and felt her condition was worsening with fever. She returned to PCP and was given cipro. She completed one full day of cipro without improvement. Her PCP advised her to come to ED after she saw no improvement.   She has fevers/chills. No flank pain. No hematuria. She has diffuse watery diarrhea since yesterday. No abd pain. In ER she received ceftriaxone.    1. Febrile syndrome. ?cause ?drug fever. Partially treated UTI. Diarrheal syndrome. Possible CDAD. ARF.   -obtain BC x 2, urin c/s  -mild pyuria 55 y/o Female with h/o HTN, hypothyroidism, morbid obesity s/p gastric sleeve, psoriatic arthritis, GERD recently diagnosed with UTI by her PCP 5 days PTA and treated with oral bactrim, then cipro was admitted on 10/29 for suprapubic pain, dysuria, fevers to 103.8F, rigors and chills. Patient took bactrim for 3 days and felt her condition was worsening with fever. She returned to PCP and was given cipro. She completed one full day of cipro without improvement. Her PCP advised her to come to ED after she saw no improvement.   She has fevers/chills. No flank pain. No hematuria. She has diffuse watery diarrhea since yesterday. No abd pain. In ER she received ceftriaxone.    1. Febrile syndrome. ?cause ?drug fever. Partially treated UTI. Diarrheal syndrome. Possible CDAD. ARF.   -leukocyosis  -possible MDRO  -obtain urine culture result from outpatient PMD  -obtain BC x 2, urine c/s  -mild pyuria  -renal US  -obtain stool for CDT  -agree with ceftriaxone 1 gm IV qd for now  -reason for abx use and side effects reviewed with patient; monitor BMP   -contact isolation  -old chart reviewed to assess prior cultures  -monitor temps  -f/u CBC  -supportive care  2. Other issues:   -care per medicine

## 2019-10-29 NOTE — PROVIDER CONTACT NOTE (OTHER) - SITUATION
DR. TEREZA BERNARDO, SPOKE WITH BIANKA FROM MD'S OFFICE. WILL INFORM PCP, PATIENT IS ADMITTED TO . MADE AWARE DR. SIMMONS IS COVERING HOSPITALIST. FAX DISCHARGE PROVIDER NOTE AND SUMMARY -[phone]

## 2019-10-30 LAB
ANION GAP SERPL CALC-SCNC: 4 MMOL/L — LOW (ref 5–17)
BUN SERPL-MCNC: 19 MG/DL — SIGNIFICANT CHANGE UP (ref 7–23)
CALCIUM SERPL-MCNC: 8.2 MG/DL — LOW (ref 8.5–10.1)
CHLORIDE SERPL-SCNC: 110 MMOL/L — HIGH (ref 96–108)
CO2 SERPL-SCNC: 27 MMOL/L — SIGNIFICANT CHANGE UP (ref 22–31)
CREAT SERPL-MCNC: 1.11 MG/DL — SIGNIFICANT CHANGE UP (ref 0.5–1.3)
GLUCOSE SERPL-MCNC: 103 MG/DL — HIGH (ref 70–99)
HCT VFR BLD CALC: 34.4 % — LOW (ref 34.5–45)
HGB BLD-MCNC: 11.7 G/DL — SIGNIFICANT CHANGE UP (ref 11.5–15.5)
MAGNESIUM SERPL-MCNC: 2.5 MG/DL — SIGNIFICANT CHANGE UP (ref 1.6–2.6)
MCHC RBC-ENTMCNC: 29.6 PG — SIGNIFICANT CHANGE UP (ref 27–34)
MCHC RBC-ENTMCNC: 34 GM/DL — SIGNIFICANT CHANGE UP (ref 32–36)
MCV RBC AUTO: 87.1 FL — SIGNIFICANT CHANGE UP (ref 80–100)
PHOSPHATE SERPL-MCNC: 2.2 MG/DL — LOW (ref 2.5–4.5)
PLATELET # BLD AUTO: 229 K/UL — SIGNIFICANT CHANGE UP (ref 150–400)
POTASSIUM SERPL-MCNC: 3.6 MMOL/L — SIGNIFICANT CHANGE UP (ref 3.5–5.3)
POTASSIUM SERPL-SCNC: 3.6 MMOL/L — SIGNIFICANT CHANGE UP (ref 3.5–5.3)
RBC # BLD: 3.95 M/UL — SIGNIFICANT CHANGE UP (ref 3.8–5.2)
RBC # FLD: 12.1 % — SIGNIFICANT CHANGE UP (ref 10.3–14.5)
SODIUM SERPL-SCNC: 141 MMOL/L — SIGNIFICANT CHANGE UP (ref 135–145)
WBC # BLD: 13.4 K/UL — HIGH (ref 3.8–10.5)
WBC # FLD AUTO: 13.4 K/UL — HIGH (ref 3.8–10.5)

## 2019-10-30 PROCEDURE — 76770 US EXAM ABDO BACK WALL COMP: CPT | Mod: 26

## 2019-10-30 RX ORDER — SODIUM,POTASSIUM PHOSPHATES 278-250MG
1 POWDER IN PACKET (EA) ORAL THREE TIMES A DAY
Refills: 0 | Status: COMPLETED | OUTPATIENT
Start: 2019-10-30 | End: 2019-10-31

## 2019-10-30 RX ADMIN — Medication 1 PACKET(S): at 11:14

## 2019-10-30 RX ADMIN — Medication 25 MICROGRAM(S): at 06:23

## 2019-10-30 RX ADMIN — CEFTRIAXONE 1000 MILLIGRAM(S): 500 INJECTION, POWDER, FOR SOLUTION INTRAMUSCULAR; INTRAVENOUS at 18:36

## 2019-10-30 RX ADMIN — CARVEDILOL PHOSPHATE 6.25 MILLIGRAM(S): 80 CAPSULE, EXTENDED RELEASE ORAL at 06:22

## 2019-10-30 RX ADMIN — Medication 650 MILLIGRAM(S): at 22:37

## 2019-10-30 RX ADMIN — CARVEDILOL PHOSPHATE 6.25 MILLIGRAM(S): 80 CAPSULE, EXTENDED RELEASE ORAL at 18:36

## 2019-10-30 RX ADMIN — Medication 650 MILLIGRAM(S): at 15:49

## 2019-10-30 RX ADMIN — Medication 1 PACKET(S): at 21:36

## 2019-10-30 RX ADMIN — Medication 1 TABLET(S): at 18:36

## 2019-10-30 RX ADMIN — Medication 650 MILLIGRAM(S): at 14:38

## 2019-10-30 RX ADMIN — Medication 1 TABLET(S): at 06:23

## 2019-10-30 RX ADMIN — Medication 650 MILLIGRAM(S): at 23:07

## 2019-10-30 RX ADMIN — MONTELUKAST 10 MILLIGRAM(S): 4 TABLET, CHEWABLE ORAL at 11:15

## 2019-10-30 NOTE — PROGRESS NOTE ADULT - ASSESSMENT
#Sepsis (POA) 2/2 to UTI - failed outpt treatment  #RICKY/ATN 2/2 to above (unknown baseline)  #Mild Hyponatremia 2ndry to Hypovolemia  #diarrhea mos likely 2ndry to ABX  -as per the PCP physicians office the urine cx showed Ecoli resistant to bactrim that explains why she was not improving  -please see urine cx in chart  -GI PCR/C diff toxin - Neg  -continue ceftriaxone and Probiotics  -IVF  -FU Blood / urine cultures NGTD which will be skewed since patient was on oral ABX prior  -Anti-pyretics  -BMP in am  - hold hctz/ARB    DVT px    Dispo - paitent came here for Sepsis and RICKY 2/2 to UTI - failed outpt treatment with bactrim and Cipro; as per the PCP urine Cx showed Ecoli resistant to bactrim; Discharge mir If no further Fever; FU with ID

## 2019-10-30 NOTE — PROGRESS NOTE ADULT - SUBJECTIVE AND OBJECTIVE BOX
Date of service: 10-30-19 @ 09:27    Lying in bed in NAD  Feels better   Fever is down Tmax 100.9F    ROS: no fever or chills; denies dizziness, no HA, no SOB or cough, no abdominal pain, no diarrhea or constipation; no legs pain, no rashes    MEDICATIONS  (STANDING):  carvedilol 6.25 milliGRAM(s) Oral every 12 hours  cefTRIAXone Injectable. 1000 milliGRAM(s) IV Push every 24 hours  lactobacillus acidophilus 1 Tablet(s) Oral two times a day  levothyroxine 25 MICROGram(s) Oral daily  montelukast 10 milliGRAM(s) Oral daily  sodium chloride 0.9%. 1000 milliLiter(s) (75 mL/Hr) IV Continuous <Continuous>      Vital Signs Last 24 Hrs  T(C): 37.6 (30 Oct 2019 05:40), Max: 38.9 (29 Oct 2019 14:45)  T(F): 99.7 (30 Oct 2019 05:40), Max: 102.1 (29 Oct 2019 14:45)  HR: 82 (30 Oct 2019 05:40) (82 - 95)  BP: 105/80 (30 Oct 2019 05:40) (105/80 - 138/60)  BP(mean): --  RR: 18 (30 Oct 2019 05:40) (18 - 18)  SpO2: 100% (30 Oct 2019 05:40) (97% - 100%)    Physical Exam:      Constitutional: frail looking  HEENT: NC/AT, EOMI, PERRLA, conjunctivae clear  Neck: supple; thyroid not palpable  Back: no tenderness  Respiratory: respiratory effort normal; clear to auscultation  Cardiovascular: S1S2 regular, no murmurs  Abdomen: soft, not tender, not distended, positive BS  Genitourinary: no suprapubic tenderness; mild left flank pain  Lymphatic: no LN palpable  Musculoskeletal: no muscle tenderness, no joint swelling or tenderness  Extremities: no pedal edema  Neurological/ Psychiatric: AxOx3, moving all extremities  Skin: no rashes; no palpable lesions    Labs: reviewed                        40 )-----------( 229      ( 30 Oct 2019 08:11 )             34.4     10    141  |  110<H>  |  19  ----------------------------<  103<H>  3.6   |  27  |  1.11    Ca    8.2<L>      30 Oct 2019 08:11  Phos  2.2     1030  Mg     2.5     10-30    TPro  7.2  /  Alb  2.5<L>  /  TBili  0.8  /  DBili  x   /  AST  18  /  ALT  34  /  AlkPhos  139<H>  10-28                        12.4   15.89 )-----------( 200      ( 29 Oct 2019 08:16 )             36.3     10-29    136  |  105  |  30<H>  ----------------------------<  105<H>  3.7   |  25  |  1.62<H>    Ca    8.1<L>      29 Oct 2019 08:16    TPro  7.2  /  Alb  2.5<L>  /  TBili  0.8  /  DBili  x   /  AST  18  /  ALT  34  /  AlkPhos  139<H>  10-28     LIVER FUNCTIONS - ( 28 Oct 2019 17:39 )  Alb: 2.5 g/dL / Pro: 7.2 gm/dL / ALK PHOS: 139 U/L / ALT: 34 U/L / AST: 18 U/L / GGT: x           Urinalysis Basic - ( 28 Oct 2019 17:39 )    Color: Maria Elena / Appearance: Slightly Turbid / S.015 / pH: x  Gluc: x / Ketone: Trace  / Bili: Negative / Urobili: Negative mg/dL   Blood: x / Protein: 100 mg/dL / Nitrite: Negative   Leuk Esterase: Small / RBC: 11-25 /HPF / WBC 11-25   Sq Epi: x / Non Sq Epi: Moderate / Bacteria: Moderate      GI PCR Panel, Stool (collected 29 Oct 2019 03:15)  Source: .Stool Feces  Final Report (29 Oct 2019 11:04):    GI PCR Results: NOT detected    *******Please Note:*******    GI panel PCR evaluates for:    Campylobacter, Plesiomonas shigelloides, Salmonella,    Vibrio, Yersinia enterocolitica, Enteroaggregative    Escherichia coli (EAEC), Enteropathogenic E.coli (EPEC),    Enterotoxigenic E. coli (ETEC) lt/st, Shiga-like    toxin-producing E. coli (STEC) stx1/stx2,    Shigella/ Enteroinvasive E. coli (EIEC), Cryptosporidium,    Cyclospora cayetanensis, Entamoeba histolytica,    Giardia lamblia, Adenovirus F 40/41, Astrovirus,    Norovirus GI/GII, Rotavirus A, Sapovirus    Culture - Urine (collected 28 Oct 2019 17:39)  Source: .Urine None  Final Report (29 Oct 2019 19:53):    No growth    Culture - Blood (collected 28 Oct 2019 17:39)  Clostridium difficile Toxin by PCR (10.29.19 @ 00:30)    C Diff by PCR Result: NotDetec    Source: .Blood None  Preliminary Report (30 Oct 2019 02:03):    No growth to date.    Culture - Blood (collected 28 Oct 2019 17:39)  Source: .Blood None  Preliminary Report (30 Oct 2019 02:03):    No growth to date.    Clostridium difficile Toxin by PCR (10.29.19 @ 00:30)    C Diff by PCR Result: NotDetec    Radiology: all available radiological tests reviewed    < from: Xray Chest 2 Views PA/Lat (10.28.19 @ 17:48) >  No definite focal consolidation    < end of copied text >      Advanced directives addressed: full resuscitation

## 2019-10-30 NOTE — PROGRESS NOTE ADULT - SUBJECTIVE AND OBJECTIVE BOX
HOSPITALIST PROGRESS NOTE:  SUBJECTIVE:  PCP:  Chief Complaint: Patient is a 54y old  Female who presents with a chief complaint of fevers, chills rigors (28 Oct 2019 21:18)      HPI:  53 yo F with pmh HTN, hypothyroidism, morbid obesity s/p gastric sleeve, psoriatic arthritis, GERD recently diagnosed with UTI by her PCP 5 days pta and started on bactrim now p/w SP pain, dysuria, fevers to 103.8, rigors and chills. Patient admittedly completed 3 days of bactrim and felt her condition was worsening after fevers developed along with abovementioned symptoms. She returned to PCP and bactrim was rotated to cipro. She completed one full day of cipro. Her PCP advised her to come to ED after she saw no improvement.   +fevers/chills. No flank pain. No hematuria. No N/V however having diffuse watery diarrhea TNTC since yesterday. No abd pain.   WBC 17. Lactate wnl. was cultured and started on ceftriaxone in ED.   She is presently stable and comfortable    10/29:  Above reviewed; Diarrhea less; no abd pain; No overnight events  10/30:  last night had a fever of 100.9; No other complaints     Allergies:  No Known Allergies    REVIEW OF SYSTEMS:  See HPI. All other review of systems is negative unless indicated above.     OBJECTIVE  Physical Exam:  Vital Signs Last 24 Hrs  T(C): 37.7 (30 Oct 2019 12:15), Max: 38.9 (29 Oct 2019 14:45)  T(F): 99.9 (30 Oct 2019 12:15), Max: 102.1 (29 Oct 2019 14:45)  HR: 78 (30 Oct 2019 11:15) (78 - 95)  BP: 127/60 (30 Oct 2019 11:15) (105/80 - 138/60)  BP(mean): --  RR: 18 (30 Oct 2019 11:15) (18 - 18)  SpO2: 98% (30 Oct 2019 11:15) (97% - 100%)    Constitutional: NAD, awake and alert, Obese   Neurological: AAO x 3, no focal deficits  HEENT: PERRLA, EOMI, MMM  Neck: Soft and supple, No LAD, No JVD  Respiratory: Breath sounds are clear bilaterally, No wheezing, rales or rhonchi  Cardiovascular: S1 and S2, regular rate and rhythm; no Murmurs, gallops or rubs  Gastrointestinal: Bowel Sounds present, soft, nontender, nondistended, no guarding, no rebound tenderness  Back: No CVA tenderness   Extremities: No peripheral edema  Vascular: 2+ peripheral pulses  Musculoskeletal: 5/5 strength b/l upper and lower extremities  Skin: No rashes  Breast: Deferred  Rectal: Deferred    MEDICATIONS  (STANDING):  carvedilol 6.25 milliGRAM(s) Oral every 12 hours  cefTRIAXone Injectable. 1000 milliGRAM(s) IV Push every 24 hours  levothyroxine 25 MICROGram(s) Oral daily  montelukast 10 milliGRAM(s) Oral daily    Lab Results:  CBC  CBC Full  -  ( 30 Oct 2019 08:11 )  WBC Count : 13.40 K/uL  RBC Count : 3.95 M/uL  Hemoglobin : 11.7 g/dL  Hematocrit : 34.4 %  Platelet Count - Automated : 229 K/uL  Mean Cell Volume : 87.1 fl  Mean Cell Hemoglobin : 29.6 pg  Mean Cell Hemoglobin Concentration : 34.0 gm/dL  Auto Neutrophil # : x  Auto Lymphocyte # : x  Auto Monocyte # : x  Auto Eosinophil # : x  Auto Basophil # : x  Auto Neutrophil % : x  Auto Lymphocyte % : x  Auto Monocyte % : x  Auto Eosinophil % : x  Auto Basophil % : x    .		Differential:	[] Automated		[] Manual  Chemistry                        11.7   13.40 )-----------( 229      ( 30 Oct 2019 08:11 )             34.4     10-30    141  |  110<H>  |  19  ----------------------------<  103<H>  3.6   |  27  |  1.11    Ca    8.2<L>      30 Oct 2019 08:11  Phos  2.2     10-30  Mg     2.5     10-30    TPro  7.2  /  Alb  2.5<L>  /  TBili  0.8  /  DBili  x   /  AST  18  /  ALT  34  /  AlkPhos  139<H>  10-    LIVER FUNCTIONS - ( 28 Oct 2019 17:39 )  Alb: 2.5 g/dL / Pro: 7.2 gm/dL / ALK PHOS: 139 U/L / ALT: 34 U/L / AST: 18 U/L / GGT: x           PT/INR - ( 28 Oct 2019 17:39 )   PT: 18.0 sec;   INR: 1.60 ratio         PTT - ( 28 Oct 2019 17:39 )  PTT:30.2 sec  Urinalysis Basic - ( 28 Oct 2019 17:39 )    Color: Maria Elena / Appearance: Slightly Turbid / S.015 / pH: x  Gluc: x / Ketone: Trace  / Bili: Negative / Urobili: Negative mg/dL   Blood: x / Protein: 100 mg/dL / Nitrite: Negative   Leuk Esterase: Small / RBC: 11-25 /HPF / WBC 11-25   Sq Epi: x / Non Sq Epi: Moderate / Bacteria: Moderate        MICROBIOLOGY/CULTURES:  Culture Results:   GI PCR Results: NOT detected  *******Please Note:*******  GI panel PCR evaluates for:  Campylobacter, Plesiomonas shigelloides, Salmonella,  Vibrio, Yersinia enterocolitica, Enteroaggregative  Escherichia coli (EAEC), Enteropathogenic E.coli (EPEC),  Enterotoxigenic E. coli (ETEC) lt/st, Shiga-like  toxin-producing E. coli (STEC) stx1/stx2,  Shigella/ Enteroinvasive E. coli (EIEC), Cryptosporidium,  Cyclospora cayetanensis, Entamoeba histolytica,  Giardia lamblia, Adenovirus F 40/41, Astrovirus,  Norovirus GI/GII, Rotavirus A, Sapovirus (10-29 @ 03:15)  Culture Results:   No growth to date. (10-28 @ 17:39)  Culture Results:   No growth to date. (10-28 @ 17:39)  Culture Results:   No growth (10-28 @ 17:39)      Urinalysis Basic - ( 28 Oct 2019 17:39 )    Color: Maria Elena / Appearance: Slightly Turbid / S.015 / pH: x  Gluc: x / Ketone: Trace  / Bili: Negative / Urobili: Negative mg/dL   Blood: x / Protein: 100 mg/dL / Nitrite: Negative   Leuk Esterase: Small / RBC: 11-25 /HPF / WBC 11-25   Sq Epi: x / Non Sq Epi: Moderate / Bacteria: Moderate      RADIOLOGY/EKG:    < from: Xray Chest 2 Views PA/Lat (10.28.19 @ 17:48) >    Impression:    No definite focal consolidation    < end of copied text >

## 2019-10-30 NOTE — PROGRESS NOTE ADULT - ASSESSMENT
53 y/o Female with h/o HTN, hypothyroidism, morbid obesity s/p gastric sleeve, psoriatic arthritis, GERD recently diagnosed with UTI by her PCP 5 days PTA and treated with oral bactrim, then cipro was admitted on 10/29 for suprapubic pain, dysuria, fevers to 103.8F, rigors and chills. Patient took bactrim for 3 days and felt her condition was worsening with fever. She returned to PCP and was given cipro. She completed one full day of cipro without improvement. Her PCP advised her to come to ED after she saw no improvement.   She has fevers/chills. No flank pain. No hematuria. She has diffuse watery diarrhea since yesterday. No abd pain. In ER she received ceftriaxone.    1. Febrile syndrome improving. ?cause ?drug fever. Partially treated UTI. Diarrheal syndrome.  -leukocytosis is improving  -repeat urine culture in  is negative  -obtain urine culture result from outpatient PMD when available  -f/u BC x 2, urine c/s  -renal function is improving  -stool CDT reviewed  -mild pyuria  -renal US  -on ceftriaxone 1 gm IV qd # 2  -tolerating abx well so far; no side effects noted  -continue abx coverage for now  -monitor temps  -f/u CBC  -supportive care  2. Other issues:   -care per medicine

## 2019-10-31 LAB
ADD ON TEST-SPECIMEN IN LAB: SIGNIFICANT CHANGE UP
ANION GAP SERPL CALC-SCNC: 6 MMOL/L — SIGNIFICANT CHANGE UP (ref 5–17)
BUN SERPL-MCNC: 14 MG/DL — SIGNIFICANT CHANGE UP (ref 7–23)
CALCIUM SERPL-MCNC: 8.7 MG/DL — SIGNIFICANT CHANGE UP (ref 8.5–10.1)
CHLORIDE SERPL-SCNC: 110 MMOL/L — HIGH (ref 96–108)
CO2 SERPL-SCNC: 28 MMOL/L — SIGNIFICANT CHANGE UP (ref 22–31)
CREAT SERPL-MCNC: 0.99 MG/DL — SIGNIFICANT CHANGE UP (ref 0.5–1.3)
GLUCOSE SERPL-MCNC: 104 MG/DL — HIGH (ref 70–99)
MAGNESIUM SERPL-MCNC: 2.5 MG/DL — SIGNIFICANT CHANGE UP (ref 1.6–2.6)
PHOSPHATE SERPL-MCNC: 4 MG/DL — SIGNIFICANT CHANGE UP (ref 2.5–4.5)
POTASSIUM SERPL-MCNC: 3.9 MMOL/L — SIGNIFICANT CHANGE UP (ref 3.5–5.3)
POTASSIUM SERPL-SCNC: 3.9 MMOL/L — SIGNIFICANT CHANGE UP (ref 3.5–5.3)
SODIUM SERPL-SCNC: 144 MMOL/L — SIGNIFICANT CHANGE UP (ref 135–145)

## 2019-10-31 PROCEDURE — 74176 CT ABD & PELVIS W/O CONTRAST: CPT | Mod: 26

## 2019-10-31 RX ORDER — CEFTRIAXONE 500 MG/1
1000 INJECTION, POWDER, FOR SOLUTION INTRAMUSCULAR; INTRAVENOUS EVERY 24 HOURS
Refills: 0 | Status: DISCONTINUED | OUTPATIENT
Start: 2019-10-31 | End: 2019-11-02

## 2019-10-31 RX ORDER — CEFTRIAXONE 500 MG/1
1000 INJECTION, POWDER, FOR SOLUTION INTRAMUSCULAR; INTRAVENOUS EVERY 24 HOURS
Refills: 0 | Status: DISCONTINUED | OUTPATIENT
Start: 2019-10-31 | End: 2019-10-31

## 2019-10-31 RX ADMIN — MONTELUKAST 10 MILLIGRAM(S): 4 TABLET, CHEWABLE ORAL at 11:37

## 2019-10-31 RX ADMIN — Medication 1 TABLET(S): at 05:44

## 2019-10-31 RX ADMIN — CARVEDILOL PHOSPHATE 6.25 MILLIGRAM(S): 80 CAPSULE, EXTENDED RELEASE ORAL at 18:17

## 2019-10-31 RX ADMIN — CEFTRIAXONE 1000 MILLIGRAM(S): 500 INJECTION, POWDER, FOR SOLUTION INTRAMUSCULAR; INTRAVENOUS at 18:43

## 2019-10-31 RX ADMIN — Medication 1 TABLET(S): at 18:16

## 2019-10-31 RX ADMIN — CARVEDILOL PHOSPHATE 6.25 MILLIGRAM(S): 80 CAPSULE, EXTENDED RELEASE ORAL at 05:44

## 2019-10-31 RX ADMIN — Medication 25 MICROGRAM(S): at 05:44

## 2019-10-31 RX ADMIN — Medication 1 PACKET(S): at 05:44

## 2019-10-31 NOTE — PROGRESS NOTE ADULT - ASSESSMENT
53 y/o Female with h/o HTN, hypothyroidism, morbid obesity s/p gastric sleeve, psoriatic arthritis, GERD recently diagnosed with UTI by her PCP 5 days PTA and treated with oral bactrim, then cipro was admitted on 10/29 for suprapubic pain, dysuria, fevers to 103.8F, rigors and chills. Patient took bactrim for 3 days and felt her condition was worsening with fever. She returned to PCP and was given cipro. She completed one full day of cipro without improvement. Her PCP advised her to come to ED after she saw no improvement.   She has fevers/chills. No flank pain. No hematuria. She has diffuse watery diarrhea since yesterday. No abd pain. In ER she received ceftriaxone.    1. Febrile syndrome is persistent. ?cause ?drug fever. Partially treated UTI. Diarrheal syndrome resolving.  -mild leukocytosis is improving  -repeat urine culture in  is negative  -urine culture result from outpatient PMD showed E.coli S to cipro/ ceftriaxone and R to bactrim  -f/u BC x 2, urine c/s  -renal function is improving  -stool CDT reviewed  -mild pyuria  -renal US  -on ceftriaxone 1 gm IV qd # 3  -tolerating abx well so far; no side effects noted  -complete abx therapy  -monitor temps  -f/u CBC  -supportive care  2. Other issues:   -care per medicine

## 2019-10-31 NOTE — PROGRESS NOTE ADULT - SUBJECTIVE AND OBJECTIVE BOX
CHIEF COMPLAINT/Diagnosis: sepsis/ UTI/ RICKY    SUBJECTIVE: no complaints; Temp last night 11pm 101.4    REVIEW OF SYSTEMS:    CONSTITUTIONAL: No weakness, fevers or chills  EYES/ENT: No visual changes;  No vertigo or throat pain   NECK: No pain or stiffness  RESPIRATORY: No cough, wheezing, hemoptysis; No shortness of breath  CARDIOVASCULAR: No chest pain or palpitations  GASTROINTESTINAL: No abdominal or epigastric pain. No nausea, vomiting, or hematemesis; No diarrhea or constipation. No melena or hematochezia.  GENITOURINARY: No dysuria, frequency or hematuria  NEUROLOGICAL: No numbness or weakness  SKIN: No itching, burning, rashes, or lesions   All other review of systems is negative unless indicated above    Vital Signs Last 24 Hrs  T(C): 37.1 (31 Oct 2019 05:04), Max: 38.6 (30 Oct 2019 22:37)  T(F): 98.7 (31 Oct 2019 05:04), Max: 101.4 (30 Oct 2019 22:37)  HR: 84 (31 Oct 2019 05:04) (64 - 89)  BP: 141/65 (31 Oct 2019 05:04) (110/83 - 141/65)  BP(mean): --  RR: 18 (31 Oct 2019 05:04) (17 - 18)  SpO2: 99% (31 Oct 2019 05:04) (98% - 100%)    I&O's Summary      CAPILLARY BLOOD GLUCOSE          PHYSICAL EXAM:    Constitutional: NAD, awake and alert, well-developed  HEENT: PERR, EOMI, Normal Hearing, MMM  Neck: Soft and supple, No LAD, No JVD  Respiratory: Breath sounds are clear bilaterally, No wheezing, rales or rhonchi  Cardiovascular: S1 and S2, regular rate and rhythm, no Murmurs, gallops or rubs  Gastrointestinal: Bowel Sounds present, soft, nontender, nondistended, no guarding, no rebound  Extremities: No peripheral edema  Vascular: 2+ peripheral pulses  Neurological: A/O x 3, no focal deficits  Musculoskeletal: 5/5 strength b/l upper and lower extremities  Skin: No rashes    MEDICATIONS:  MEDICATIONS  (STANDING):  carvedilol 6.25 milliGRAM(s) Oral every 12 hours  cefTRIAXone Injectable. 1000 milliGRAM(s) IV Push every 24 hours  lactobacillus acidophilus 1 Tablet(s) Oral two times a day  levothyroxine 25 MICROGram(s) Oral daily  montelukast 10 milliGRAM(s) Oral daily      LABS: All Labs Reviewed:                        11.7   13.40 )-----------( 229      ( 30 Oct 2019 08:11 )             34.4     10-30    141  |  110<H>  |  19  ----------------------------<  103<H>  3.6   |  27  |  1.11    Ca    8.2<L>      30 Oct 2019 08:11  Phos  2.2     10-30  Mg     2.5     10-30            Blood Culture: 10-29 @ 03:15  Organism --  Gram Stain Blood -- Gram Stain --  Specimen Source .Stool Feces  Culture-Blood --    10-28 @ 17:39  Organism --  Gram Stain Blood -- Gram Stain --  Specimen Source .Blood None  Culture-Blood --        Assessment and Plan:       #Sepsis (POA) 2/2 to UTI - failed outpt treatment  #RICKY/ATN 2/2 to above (unknown baseline)  #Mild Hyponatremia 2ndry to Hypovolemia  #diarrhea mos likely 2ndry to ABX  -ongoing Temps 101.4; Send ESR and Lipid panel  -as per the PCP physicians office the urine cx showed Ecoli resistant to bactrim that explains why she was not improving  -please see urine cx in chart  -GI PCR/C diff toxin - Neg  -continue ceftriaxone and Probiotics  -IVF  -FU Blood / urine cultures NGTD which will be skewed since patient was on oral ABX prior  -Anti-pyretics  -BMP in am  - hold hctz/ARB    DVT px    Dispo - paitent came here for Sepsis and RICKY 2/2 to UTI - failed outpt treatment with bactrim and Cipro; as per the PCP urine Cx showed Ecoli resistant to bactrim; ; FU with ID

## 2019-10-31 NOTE — CHART NOTE - NSCHARTNOTEFT_GEN_A_CORE
Was notified by overnight nurse patient had mental status change during the day. Had not notified day time Nurse. Patient seen at bedside. Patient not reporting any present mental status changes or confusion. No changes in vitals signs. Instructed to inform nurse of any new changes over night. Primary team to follow up in the AM.

## 2019-10-31 NOTE — PROGRESS NOTE ADULT - SUBJECTIVE AND OBJECTIVE BOX
Date of service: 10-31-19 @ 10:22    Sitting in bed in NAD  Diarrhea resolved  No dysuria  Febrile to 101.4F yesterday with mild confusion  No new symptoms    ROS: denies dizziness, no HA, no SOB or cough, no abdominal pain, no diarrhea or constipation; no dysuria, no legs pain, no rashes    MEDICATIONS  (STANDING):  carvedilol 6.25 milliGRAM(s) Oral every 12 hours  lactobacillus acidophilus 1 Tablet(s) Oral two times a day  levothyroxine 25 MICROGram(s) Oral daily  montelukast 10 milliGRAM(s) Oral daily      Vital Signs Last 24 Hrs  T(C): 37.1 (31 Oct 2019 05:04), Max: 38.6 (30 Oct 2019 22:37)  T(F): 98.7 (31 Oct 2019 05:04), Max: 101.4 (30 Oct 2019 22:37)  HR: 84 (31 Oct 2019 05:04) (64 - 89)  BP: 141/65 (31 Oct 2019 05:04) (110/83 - 141/65)  BP(mean): --  RR: 18 (31 Oct 2019 05:04) (17 - 18)  SpO2: 99% (31 Oct 2019 05:04) (98% - 100%)    Physical Exam:      Constitutional: frail looking  HEENT: NC/AT, EOMI, PERRLA, conjunctivae clear  Neck: supple; thyroid not palpable  Back: no tenderness  Respiratory: respiratory effort normal; clear to auscultation  Cardiovascular: S1S2 regular, no murmurs  Abdomen: soft, not tender, not distended, positive BS  Genitourinary: no suprapubic tenderness; mild left flank pain  Lymphatic: no LN palpable  Musculoskeletal: no muscle tenderness, no joint swelling or tenderness  Extremities: no pedal edema  Neurological/ Psychiatric: AxOx3, moving all extremities  Skin: no rashes; no palpable lesions    Labs: reviewed                        11.7   13.40 )-----------( 229      ( 30 Oct 2019 08:11 )             34.4     10    141  |  110<H>  |  19  ----------------------------<  103<H>  3.6   |  27  |  1.11    Ca    8.2<L>      30 Oct 2019 08:11  Phos  2.2     10-30  Mg     2.5     10-    TPro  7.2  /  Alb  2.5<L>  /  TBili  0.8  /  DBili  x   /  AST  18  /  ALT  34  /  AlkPhos  139<H>  10-28                        12.4   15.89 )-----------( 200      ( 29 Oct 2019 08:16 )             36.3     10-    136  |  105  |  30<H>  ----------------------------<  105<H>  3.7   |  25  |  1.62<H>    Ca    8.1<L>      29 Oct 2019 08:16    TPro  7.2  /  Alb  2.5<L>  /  TBili  0.8  /  DBili  x   /  AST  18  /  ALT  34  /  AlkPhos  139<H>  10-28     LIVER FUNCTIONS - ( 28 Oct 2019 17:39 )  Alb: 2.5 g/dL / Pro: 7.2 gm/dL / ALK PHOS: 139 U/L / ALT: 34 U/L / AST: 18 U/L / GGT: x           Urinalysis Basic - ( 28 Oct 2019 17:39 )    Color: Maria Elena / Appearance: Slightly Turbid / S.015 / pH: x  Gluc: x / Ketone: Trace  / Bili: Negative / Urobili: Negative mg/dL   Blood: x / Protein: 100 mg/dL / Nitrite: Negative   Leuk Esterase: Small / RBC: 11-25 /HPF / WBC 11-25   Sq Epi: x / Non Sq Epi: Moderate / Bacteria: Moderate      GI PCR Panel, Stool (collected 29 Oct 2019 03:15)  Source: .Stool Feces  Final Report (29 Oct 2019 11:04):    GI PCR Results: NOT detected    *******Please Note:*******    GI panel PCR evaluates for:    Campylobacter, Plesiomonas shigelloides, Salmonella,    Vibrio, Yersinia enterocolitica, Enteroaggregative    Escherichia coli (EAEC), Enteropathogenic E.coli (EPEC),    Enterotoxigenic E. coli (ETEC) lt/st, Shiga-like    toxin-producing E. coli (STEC) stx1/stx2,    Shigella/ Enteroinvasive E. coli (EIEC), Cryptosporidium,    Cyclospora cayetanensis, Entamoeba histolytica,    Giardia lamblia, Adenovirus F 40/41, Astrovirus,    Norovirus GI/GII, Rotavirus A, Sapovirus    Culture - Urine (collected 28 Oct 2019 17:39)  Source: .Urine None  Final Report (29 Oct 2019 19:53):    No growth    Culture - Blood (collected 28 Oct 2019 17:39)  Clostridium difficile Toxin by PCR (10.29.19 @ 00:30)    C Diff by PCR Result: NotDete    Source: .Blood None  Preliminary Report (30 Oct 2019 02:03):    No growth to date.    Culture - Blood (collected 28 Oct 2019 17:39)  Source: .Blood None  Preliminary Report (30 Oct 2019 02:03):    No growth to date.    Clostridium difficile Toxin by PCR (10.29.19 @ 00:30)    [facility]: [facility]    Radiology: all available radiological tests reviewed    < from: Xray Chest 2 Views PA/Lat (10.28.19 @ 17:48) >  No definite focal consolidation    < end of copied text >      Advanced directives addressed: full resuscitation

## 2019-11-01 LAB
ANION GAP SERPL CALC-SCNC: 5 MMOL/L — SIGNIFICANT CHANGE UP (ref 5–17)
BUN SERPL-MCNC: 13 MG/DL — SIGNIFICANT CHANGE UP (ref 7–23)
CALCIUM SERPL-MCNC: 8.7 MG/DL — SIGNIFICANT CHANGE UP (ref 8.5–10.1)
CHLORIDE SERPL-SCNC: 107 MMOL/L — SIGNIFICANT CHANGE UP (ref 96–108)
CO2 SERPL-SCNC: 30 MMOL/L — SIGNIFICANT CHANGE UP (ref 22–31)
CREAT SERPL-MCNC: 1.03 MG/DL — SIGNIFICANT CHANGE UP (ref 0.5–1.3)
GLUCOSE SERPL-MCNC: 106 MG/DL — HIGH (ref 70–99)
HCT VFR BLD CALC: 34.3 % — LOW (ref 34.5–45)
HGB BLD-MCNC: 11.3 G/DL — LOW (ref 11.5–15.5)
MCHC RBC-ENTMCNC: 29.4 PG — SIGNIFICANT CHANGE UP (ref 27–34)
MCHC RBC-ENTMCNC: 32.9 GM/DL — SIGNIFICANT CHANGE UP (ref 32–36)
MCV RBC AUTO: 89.3 FL — SIGNIFICANT CHANGE UP (ref 80–100)
PLATELET # BLD AUTO: 305 K/UL — SIGNIFICANT CHANGE UP (ref 150–400)
POTASSIUM SERPL-MCNC: 4.3 MMOL/L — SIGNIFICANT CHANGE UP (ref 3.5–5.3)
POTASSIUM SERPL-SCNC: 4.3 MMOL/L — SIGNIFICANT CHANGE UP (ref 3.5–5.3)
RBC # BLD: 3.84 M/UL — SIGNIFICANT CHANGE UP (ref 3.8–5.2)
RBC # FLD: 12.4 % — SIGNIFICANT CHANGE UP (ref 10.3–14.5)
SODIUM SERPL-SCNC: 142 MMOL/L — SIGNIFICANT CHANGE UP (ref 135–145)
WBC # BLD: 13.46 K/UL — HIGH (ref 3.8–10.5)
WBC # FLD AUTO: 13.46 K/UL — HIGH (ref 3.8–10.5)

## 2019-11-01 RX ADMIN — CARVEDILOL PHOSPHATE 6.25 MILLIGRAM(S): 80 CAPSULE, EXTENDED RELEASE ORAL at 05:50

## 2019-11-01 RX ADMIN — CEFTRIAXONE 1000 MILLIGRAM(S): 500 INJECTION, POWDER, FOR SOLUTION INTRAMUSCULAR; INTRAVENOUS at 17:07

## 2019-11-01 RX ADMIN — Medication 1 TABLET(S): at 05:49

## 2019-11-01 RX ADMIN — Medication 25 MICROGRAM(S): at 05:49

## 2019-11-01 RX ADMIN — MONTELUKAST 10 MILLIGRAM(S): 4 TABLET, CHEWABLE ORAL at 10:14

## 2019-11-01 RX ADMIN — CARVEDILOL PHOSPHATE 6.25 MILLIGRAM(S): 80 CAPSULE, EXTENDED RELEASE ORAL at 17:07

## 2019-11-01 RX ADMIN — Medication 1 TABLET(S): at 17:07

## 2019-11-01 NOTE — PROGRESS NOTE ADULT - SUBJECTIVE AND OBJECTIVE BOX
Date of service: 19 @ 09:19    Lying in bed in NAD  Fever is down Tmax 100.1F  Denies flank pain  Has urinary frequency    ROS: denies dizziness, no HA, no SOB or cough, no abdominal pain, no diarrhea or constipation; no legs pain, no rashes    MEDICATIONS  (STANDING):  carvedilol 6.25 milliGRAM(s) Oral every 12 hours  cefTRIAXone Injectable. 1000 milliGRAM(s) IV Push every 24 hours  lactobacillus acidophilus 1 Tablet(s) Oral two times a day  levothyroxine 25 MICROGram(s) Oral daily  montelukast 10 milliGRAM(s) Oral daily      Vital Signs Last 24 Hrs  T(C): 37.1 (2019 05:15), Max: 37.8 (31 Oct 2019 16:40)  T(F): 98.8 (2019 05:15), Max: 100.1 (31 Oct 2019 16:40)  HR: 85 (2019 05:15) (78 - 85)  BP: 159/68 (2019 05:15) (141/60 - 159/68)  BP(mean): --  RR: 18 (2019 05:15) (18 - 18)  SpO2: 98% (2019 05:15) (96% - 99%)    Physical Exam:      Constitutional: frail looking  HEENT: NC/AT, EOMI, PERRLA, conjunctivae clear  Neck: supple; thyroid not palpable  Back: no tenderness  Respiratory: respiratory effort normal; clear to auscultation  Cardiovascular: S1S2 regular, no murmurs  Abdomen: soft, not tender, not distended, positive BS  Genitourinary: no suprapubic tenderness; mild left flank pain  Lymphatic: no LN palpable  Musculoskeletal: no muscle tenderness, no joint swelling or tenderness  Extremities: no pedal edema  Neurological/ Psychiatric: AxOx3, moving all extremities  Skin: no rashes; no palpable lesions    Labs: reviewedLabs: reviewed                        11.3   13.46 )-----------( 305      ( 2019 08:15 )             34.3         142  |  107  |  13  ----------------------------<  106<H>  4.3   |  30  |  1.03    Ca    8.7      2019 08:15  Phos  4.0     10-  Mg     2.5     10                        12.4   15.89 )-----------( 200      ( 29 Oct 2019 08:16 )             36.3     10-29    136  |  105  |  30<H>  ----------------------------<  105<H>  3.7   |  25  |  1.62<H>    Ca    8.1<L>      29 Oct 2019 08:16    TPro  7.2  /  Alb  2.5<L>  /  TBili  0.8  /  DBili  x   /  AST  18  /  ALT  34  /  AlkPhos  139<H>  10-28     LIVER FUNCTIONS - ( 28 Oct 2019 17:39 )  Alb: 2.5 g/dL / Pro: 7.2 gm/dL / ALK PHOS: 139 U/L / ALT: 34 U/L / AST: 18 U/L / GGT: x           Urinalysis Basic - ( 28 Oct 2019 17:39 )    Color: Maria Elena / Appearance: Slightly Turbid / S.015 / pH: x  Gluc: x / Ketone: Trace  / Bili: Negative / Urobili: Negative mg/dL   Blood: x / Protein: 100 mg/dL / Nitrite: Negative   Leuk Esterase: Small / RBC: 11-25 /HPF / WBC 11-25   Sq Epi: x / Non Sq Epi: Moderate / Bacteria: Moderate      GI PCR Panel, Stool (collected 29 Oct 2019 03:15)  Source: .Stool Feces  Final Report (29 Oct 2019 11:04):    GI PCR Results: NOT detected    Culture - Urine (collected 28 Oct 2019 17:39)  Source: .Urine None  Final Report (29 Oct 2019 19:53):    No growth    Culture - Blood (collected 28 Oct 2019 17:39)  Clostridium difficile Toxin by PCR (10.29.19 @ 00:30)    C Diff by PCR Result: NotDetec    Source: .Blood None  Preliminary Report (30 Oct 2019 02:03):    No growth to date.    Culture - Blood (collected 28 Oct 2019 17:39)  Source: .Blood None  Preliminary Report (30 Oct 2019 02:03):    No growth to date.    Clostridium difficile Toxin by PCR (10.29.19 @ 00:30)    C Diff by PCR Result: NotDetec    Radiology: all available radiological tests reviewed    < from: Xray Chest 2 Views PA/Lat (10.28.19 @ 17:48) >  No definite focal consolidation    < end of copied text >    < from: CT Abdomen and Pelvis No Cont (10.31.19 @ 11:47) >  No urinary tract calculi or hydronephrosis. Mild bilateral renal   enlargement and edema. Correlate for UTI/pyelonephritis.    < end of copied text >      Advanced directives addressed: full resuscitation

## 2019-11-01 NOTE — PROGRESS NOTE ADULT - ASSESSMENT
55 y/o Female with h/o HTN, hypothyroidism, morbid obesity s/p gastric sleeve, psoriatic arthritis, GERD recently diagnosed with UTI by her PCP 5 days PTA and treated with oral bactrim, then cipro was admitted on 10/29 for suprapubic pain, dysuria, fevers to 103.8F, rigors and chills. Patient took bactrim for 3 days and felt her condition was worsening with fever. She returned to PCP and was given cipro. She completed one full day of cipro without improvement. Her PCP advised her to come to ED after she saw no improvement.   She has fevers/chills. No flank pain. No hematuria. She has diffuse watery diarrhea since yesterday. No abd pain. In ER she received ceftriaxone.    1. Febrile syndrome is persistent, but improving. Partially treated UTI with E.coli. Probable acute pyelonephritis.   -CT abdomen reviewed - suspicion for kidney infection  -leukocytosis is persistent  -repeat urine culture in  is negative  -urine culture result from outpatient PMD showed E.coli S to cipro/ ceftriaxone and R to bactrim  -f/u BC x 2, urine c/s  -renal function is improving  -stool CDT reviewed  -mild pyuria  -renal US  -on ceftriaxone 1 gm IV qd # 4  -tolerating abx well so far; no side effects noted  -continue abx therapy  -monitor temps  -f/u CBC  -supportive care  2. Other issues:   -care per medicine

## 2019-11-01 NOTE — PROGRESS NOTE ADULT - SUBJECTIVE AND OBJECTIVE BOX
SUBJECTIVE: still feels unwell, mild nausea    PHYSICAL EXAM:  Constitutional: NAD, awake and alert, well-developed  HEENT: PERR, EOMI, Normal Hearing, MMM  Neck: Soft and supple, No LAD, No JVD  Respiratory: Breath sounds are clear bilaterally, No wheezing, rales or rhonchi  Cardiovascular: S1 and S2, regular rate and rhythm, no Murmurs, gallops or rubs  Gastrointestinal: Bowel Sounds present, soft, nontender, nondistended, no guarding, no rebound  no CVA tenderness  Extremities: No peripheral edema  Vascular: 2+ peripheral pulses  Neurological: A/O x 3, no focal deficits  Musculoskeletal: 5/5 strength b/l upper and lower extremities  Skin: No rashes    LABS: All Labs Reviewed:                        11.3   13.46 )-----------( 305      ( 01 Nov 2019 08:15 )             34.3     RADIOLOGY/EKG:  < from: CT Abdomen and Pelvis No Cont (10.31.19 @ 11:47) >  No urinary tract calculi or hydronephrosis. Mild bilateral renal   enlargement and edema. Correlate for UTI/pyelonephritis.  < end of copied text >  acetaminophen   Tablet .. 650 milliGRAM(s) Oral every 4 hours PRN  aluminum hydroxide/magnesium hydroxide/simethicone Suspension 30 milliLiter(s) Oral every 4 hours PRN  carvedilol 6.25 milliGRAM(s) Oral every 12 hours  cefTRIAXone Injectable. 1000 milliGRAM(s) IV Push every 24 hours  gabapentin 300 milliGRAM(s) Oral two times a day PRN  lactobacillus acidophilus 1 Tablet(s) Oral two times a day  levothyroxine 25 MICROGram(s) Oral daily  montelukast 10 milliGRAM(s) Oral daily  ondansetron Injectable 4 milliGRAM(s) IV Push every 6 hours PRN

## 2019-11-01 NOTE — PROGRESS NOTE ADULT - ASSESSMENT
#Sepsis (POA) 2/2 to UTI - failed outpt treatment  #RICKY/ATN 2/2 to above (unknown baseline)  #Mild Hyponatremia 2ndry to Hypovolemia  #diarrhea mos likely 2ndry to ABX      Plan:  -as per the PCP physicians office the urine cx showed Ecoli resistant to bactrim that explains why she was not improving  -please see urine cx in chart  -GI PCR/C diff toxin - Neg  -continue ceftriaxone and Probiotics - discussed with ID Dr Whiteside  -FU Blood / urine cultures NGTD which will be skewed since patient was on oral ABX prior  -Anti-pyretics  - hold hctz/ARB    DVT px    Dispo - paitent came here for Sepsis and RICKY 2/2 to UTI - failed outpt treatment with 2 course of antibiotics -  bactrim and Cipro; as per the PCP urine Cx showed Ecoli resistant to bactrim; continue IV antibiotics, dc home tomorrow

## 2019-11-02 ENCOUNTER — TRANSCRIPTION ENCOUNTER (OUTPATIENT)
Age: 54
End: 2019-11-02

## 2019-11-02 VITALS
TEMPERATURE: 98 F | OXYGEN SATURATION: 96 % | HEART RATE: 78 BPM | SYSTOLIC BLOOD PRESSURE: 132 MMHG | RESPIRATION RATE: 17 BRPM | DIASTOLIC BLOOD PRESSURE: 72 MMHG

## 2019-11-02 LAB
BASOPHILS # BLD AUTO: 0 K/UL — SIGNIFICANT CHANGE UP (ref 0–0.2)
BASOPHILS NFR BLD AUTO: 0 % — SIGNIFICANT CHANGE UP (ref 0–2)
EOSINOPHIL # BLD AUTO: 0.13 K/UL — SIGNIFICANT CHANGE UP (ref 0–0.5)
EOSINOPHIL NFR BLD AUTO: 1 % — SIGNIFICANT CHANGE UP (ref 0–6)
HCT VFR BLD CALC: 35 % — SIGNIFICANT CHANGE UP (ref 34.5–45)
HGB BLD-MCNC: 11.4 G/DL — LOW (ref 11.5–15.5)
LYMPHOCYTES # BLD AUTO: 1.38 K/UL — SIGNIFICANT CHANGE UP (ref 1–3.3)
LYMPHOCYTES # BLD AUTO: 11 % — LOW (ref 13–44)
MCHC RBC-ENTMCNC: 29.6 PG — SIGNIFICANT CHANGE UP (ref 27–34)
MCHC RBC-ENTMCNC: 32.6 GM/DL — SIGNIFICANT CHANGE UP (ref 32–36)
MCV RBC AUTO: 90.9 FL — SIGNIFICANT CHANGE UP (ref 80–100)
MONOCYTES # BLD AUTO: 0.75 K/UL — SIGNIFICANT CHANGE UP (ref 0–0.9)
MONOCYTES NFR BLD AUTO: 6 % — SIGNIFICANT CHANGE UP (ref 2–14)
NEUTROPHILS # BLD AUTO: 10.28 K/UL — HIGH (ref 1.8–7.4)
NEUTROPHILS NFR BLD AUTO: 81 % — HIGH (ref 43–77)
NRBC # BLD: SIGNIFICANT CHANGE UP /100 WBCS (ref 0–0)
PLATELET # BLD AUTO: 314 K/UL — SIGNIFICANT CHANGE UP (ref 150–400)
RBC # BLD: 3.85 M/UL — SIGNIFICANT CHANGE UP (ref 3.8–5.2)
RBC # FLD: 12.3 % — SIGNIFICANT CHANGE UP (ref 10.3–14.5)
WBC # BLD: 12.54 K/UL — HIGH (ref 3.8–10.5)
WBC # FLD AUTO: 12.54 K/UL — HIGH (ref 3.8–10.5)

## 2019-11-02 RX ORDER — CEFUROXIME AXETIL 250 MG
1 TABLET ORAL
Qty: 18 | Refills: 0
Start: 2019-11-02 | End: 2019-11-10

## 2019-11-02 RX ADMIN — Medication 25 MICROGRAM(S): at 06:09

## 2019-11-02 RX ADMIN — CARVEDILOL PHOSPHATE 6.25 MILLIGRAM(S): 80 CAPSULE, EXTENDED RELEASE ORAL at 06:09

## 2019-11-02 RX ADMIN — Medication 1 TABLET(S): at 06:09

## 2019-11-02 NOTE — DISCHARGE NOTE NURSING/CASE MANAGEMENT/SOCIAL WORK - PATIENT PORTAL LINK FT
You can access the FollowMyHealth Patient Portal offered by [facility] by registering at the following website: [email or web address]. By joining [facility]’s FollowMyHealth portal, you will also be able to view your health information using other applications (apps) compatible with our system.

## 2019-11-02 NOTE — DISCHARGE NOTE PROVIDER - CARE PROVIDER_API CALL
Sugar Crespo)  Internal Medicine  [address] [place] [address]  Phone: [phone]  Fax: [phone]  Follow Up Time:

## 2019-11-02 NOTE — DISCHARGE NOTE PROVIDER - NSDCMRMEDTOKEN_GEN_ALL_CORE_FT
carvedilol 6.25 mg oral tablet: 1 tab(s) orally 2 times a day  cefuroxime 500 mg oral tablet: 1 tab(s) orally every 12 hours   Fish Oil 1000 mg oral capsule: 1 cap(s) orally 2 times a day  gabapentin 300 mg oral capsule: 1 cap(s) orally 2 times a day, As Needed - for nerve pain  levothyroxine 25 mcg (0.025 mg) oral tablet: 1 tab(s) orally once a day  montelukast 10 mg oral tablet: 1 tab(s) orally once a day  valsartan-hydrochlorothiazide 160mg-12.5mg oral tablet: 1 tab(s) orally once a day

## 2019-11-02 NOTE — DISCHARGE NOTE PROVIDER - HOSPITAL COURSE
55 yo F with pmh HTN, hypothyroidism, morbid obesity s/p gastric sleeve, psoriatic arthritis, GERD recently diagnosed with UTI by her PCP 5 days pta and started on bactrim now p/w SP pain, dysuria, fevers to 103.8, rigors and chills. Patient admittedly completed 3 days of bactrim and felt her condition was worsening after fevers developed along with abovementioned symptoms. She returned to PCP and bactrim was rotated to cipro. She completed one full day of cipro. Her PCP advised her to come to ED after she saw no improvement. Pt had fevers/chills. No flank pain. No hematuria. No N/V however having diffuse watery diarrhea TNTC since yesterday. No abd pain.     WBC 17. Lactate wnl. Pt was admitted for sepsis (POA) 2/2 to UTI - failed outpt treatment on 2 course of antibiotics. Pt also had RICKY/ATN 2/2 to above (unknown baseline) in the setting of sepsis. She had mild Hyponatremia 2ndry to Hypovolemia and diarrhea from ABX    As per the PCP physicians office the urine cx showed Ecoli resistant to bactrim that explains why she was not improving    -GI PCR/C diff toxin - Neg    -continue ceftriaxone and Probiotics - discussed with ID Dr Whiteside - change to po ceftin for nine more     days. -FU Blood / urine cultures NGTD which will be skewed since patient was on oral ABX prior    Can resuem BP meds for HTN.    Pt now afebrile, doing well, WBC improved.     Dispo - paitent came here for Sepsis and RICKY 2/2 to UTI - failed outpt treatment with 2 course of antibiotics -  bactrim and Cipro; as per the PCP urine Cx showed Ecoli resistant to bactrim; stop IV antibiotics, dc home today and f/u with Dr Crespo PMD in 7-10 days.        SUB - improved, no complaints, no dysuria or back pain     PHYSICAL EXAM:    GENERAL: NAD, able to lie flat in bed    HEAD:  Atraumatic, Normocephalic    EYES: EOMI, PERRLA, normal sclera    ENT: Moist mucous membranes    NECK: Supple, No JVD, no nuchal rigidity    CHEST/LUNG: Clear to auscultation bilaterally; No rales, rhonchi, wheezing, or rubs. Unlabored respirations    HEART: Regular rate and rhythm; No murmurs, rubs, or gallops    ABDOMEN: Bowel sounds present; Soft, Nontender, Nondistended. No hepatomegaly    EXTREMITIES:  no pitting bilaterally    NERVOUS SYSTEM:  Alert & Oriented X3, speech clear. No focal motor or sensory deficits    MSK: FROM all 4 extremities, full and equal strength    SKIN: No rashes or lesions    LABS: All Labs Reviewed:                            11.4     12.54 )-----------( 314      ( 02 Nov 2019 07:44 )               35.0     RADIOLOGY/EKG:    < from: CT Abdomen and Pelvis No Cont (10.31.19 @ 11:47) >    No urinary tract calculi or hydronephrosis. Mild bilateral renal     enlargement and edema. Correlate for UTI/pyelonephritis.    < end of copied text >    MED:    acetaminophen   Tablet .. 650 milliGRAM(s) Oral every 4 hours PRN    aluminum hydroxide/magnesium hydroxide/simethicone Suspension 30 milliLiter(s) Oral every 4 hours PRN    carvedilol 6.25 milliGRAM(s) Oral every 12 hours    cefTRIAXone Injectable. 1000 milliGRAM(s) IV Push every 24 hours    gabapentin 300 milliGRAM(s) Oral two times a day PRN    lactobacillus acidophilus 1 Tablet(s) Oral two times a day    levothyroxine 25 MICROGram(s) Oral daily    montelukast 10 milliGRAM(s) Oral daily    ondansetron Injectable 4 milliGRAM(s) IV Push every 6 hours PRN    Time spent on discharge - 50 mins    discussed with RN and Dr Whiteside

## 2019-11-02 NOTE — PROGRESS NOTE ADULT - NSHPATTENDINGPLANDISCUSS_GEN_ALL_CORE
lisa at bedside and Dr. Yang
patient, nursing, staff
Patient, nurse
Patient, nurse
daughter at bedside
lisa at bedside and Dr. Yang
lisa at bedside and Dr. Yang
patient, nursing, staff

## 2019-11-02 NOTE — PROGRESS NOTE ADULT - PROVIDER SPECIALTY LIST ADULT
Hospitalist
Infectious Disease

## 2019-11-02 NOTE — PROGRESS NOTE ADULT - ASSESSMENT
55 y/o Female with h/o HTN, hypothyroidism, morbid obesity s/p gastric sleeve, psoriatic arthritis, GERD recently diagnosed with UTI by her PCP 5 days PTA and treated with oral bactrim, then cipro was admitted on 10/29 for suprapubic pain, dysuria, fevers to 103.8F, rigors and chills. Patient took bactrim for 3 days and felt her condition was worsening with fever. She returned to PCP and was given cipro. She completed one full day of cipro without improvement. Her PCP advised her to come to ED after she saw no improvement.   She has fevers/chills. No flank pain. No hematuria. She has diffuse watery diarrhea since yesterday. No abd pain. In ER she received ceftriaxone.    1. Febrile syndrome is improving. Partially treated UTI with E.coli. Probable acute pyelonephritis.   -CT abdomen reviewed - suspicion for kidney infection  -leukocytosis is persistent  -repeat urine culture in  is negative  -urine culture result from outpatient PMD showed E.coli S to cipro/ ceftriaxone and R to bactrim  -f/u BC x 2, urine c/s  -renal function is improving  -stool CDT reviewed  -on ceftriaxone 1 gm IV qd # 5  -tolerating abx well so far; no side effects noted  -may change abx to ceftin 500 mg PO q12h for 10 more days  -monitor temps  -f/u CBC  -supportive care  2. Other issues:   -care per medicine

## 2019-11-02 NOTE — PROGRESS NOTE ADULT - REASON FOR ADMISSION
fevers, chills rigors

## 2019-11-02 NOTE — PROGRESS NOTE ADULT - SUBJECTIVE AND OBJECTIVE BOX
Date of service: 19 @ 08:23    Lying in bed in NAD  Fever is down  Tmax 99.6F  Denies pain    ROS: denies dizziness, no HA, no SOB or cough, no abdominal pain, no diarrhea or constipation; no dysuria, no legs pain, no rashes    MEDICATIONS  (STANDING):  carvedilol 6.25 milliGRAM(s) Oral every 12 hours  cefTRIAXone Injectable. 1000 milliGRAM(s) IV Push every 24 hours  lactobacillus acidophilus 1 Tablet(s) Oral two times a day  levothyroxine 25 MICROGram(s) Oral daily  montelukast 10 milliGRAM(s) Oral daily      Vital Signs Last 24 Hrs  T(C): 37.5 (2019 05:59), Max: 37.6 (2019 00:09)  T(F): 99.5 (2019 05:59), Max: 99.6 (2019 00:09)  HR: 93 (2019 05:59) (59 - 93)  BP: 145/59 (2019 05:59) (134/64 - 157/73)  BP(mean): --  RR: 18 (2019 05:59) (18 - 18)  SpO2: 97% (2019 05:59) (97% - 99%)    Physical Exam:      Constitutional: frail looking  HEENT: NC/AT, EOMI, PERRLA, conjunctivae clear  Neck: supple; thyroid not palpable  Back: no tenderness  Respiratory: respiratory effort normal; clear to auscultation  Cardiovascular: S1S2 regular, no murmurs  Abdomen: soft, not tender, not distended, positive BS  Genitourinary: no suprapubic tenderness; mild left flank pain  Lymphatic: no LN palpable  Musculoskeletal: no muscle tenderness, no joint swelling or tenderness  Extremities: no pedal edema  Neurological/ Psychiatric: AxOx3, moving all extremities  Skin: no rashes; no palpable lesions    Labs: reviewed                        11.3   13.46 )-----------( 305      ( 2019 08:15 )             34.3         142  |  107  |  13  ----------------------------<  106<H>  4.3   |  30  |  1.03    Ca    8.7      2019 08:15  Phos  4.0     10-  Mg     2.5     10                        12.4   15.89 )-----------( 200      ( 29 Oct 2019 08:16 )             36.3     10-29    136  |  105  |  30<H>  ----------------------------<  105<H>  3.7   |  25  |  1.62<H>    Ca    8.1<L>      29 Oct 2019 08:16    TPro  7.2  /  Alb  2.5<L>  /  TBili  0.8  /  DBili  x   /  AST  18  /  ALT  34  /  AlkPhos  139<H>  10     LIVER FUNCTIONS - ( 28 Oct 2019 17:39 )  Alb: 2.5 g/dL / Pro: 7.2 gm/dL / ALK PHOS: 139 U/L / ALT: 34 U/L / AST: 18 U/L / GGT: x           Urinalysis Basic - ( 28 Oct 2019 17:39 )    Color: Maria Elena / Appearance: Slightly Turbid / S.015 / pH: x  Gluc: x / Ketone: Trace  / Bili: Negative / Urobili: Negative mg/dL   Blood: x / Protein: 100 mg/dL / Nitrite: Negative   Leuk Esterase: Small / RBC: 11-25 /HPF / WBC 11-25   Sq Epi: x / Non Sq Epi: Moderate / Bacteria: Moderate      GI PCR Panel, Stool (collected 29 Oct 2019 03:15)  Source: .Stool Feces  Final Report (29 Oct 2019 11:04):    GI PCR Results: NOT detected    Culture - Urine (collected 28 Oct 2019 17:39)  Source: .Urine None  Final Report (29 Oct 2019 19:53):    No growth    Culture - Blood (collected 28 Oct 2019 17:39)  Clostridium difficile Toxin by PCR (10.29.19 @ 00:30)    C Diff by PCR Result: NotDetec    Source: .Blood None  Preliminary Report (30 Oct 2019 02:03):    No growth to date.    Culture - Blood (collected 28 Oct 2019 17:39)  Source: .Blood None  Preliminary Report (30 Oct 2019 02:03):    No growth to date.    Clostridium difficile Toxin by PCR (10.29.19 @ 00:30)    C Diff by PCR Result: NotDetec    Radiology: all available radiological tests reviewed    < from: Xray Chest 2 Views PA/Lat (10.28.19 @ 17:48) >  No definite focal consolidation    < end of copied text >    < from: CT Abdomen and Pelvis No Cont (10.31.19 @ 11:47) >  No urinary tract calculi or hydronephrosis. Mild bilateral renal   enlargement and edema. Correlate for UTI/pyelonephritis.    < end of copied text >      Advanced directives addressed: full resuscitation

## 2019-11-02 NOTE — DISCHARGE NOTE PROVIDER - NSDCCPCAREPLAN_GEN_ALL_CORE_FT
PRINCIPAL DISCHARGE DIAGNOSIS  Diagnosis: Urinary tract infection with hematuria, site unspecified  Assessment and Plan of Treatment:       SECONDARY DISCHARGE DIAGNOSES  Diagnosis: Sepsis, due to unspecified organism, unspecified whether acute organ dysfunction present  Assessment and Plan of Treatment:     Diagnosis: Rigors  Assessment and Plan of Treatment:     Diagnosis: Fever, unspecified fever cause  Assessment and Plan of Treatment:

## 2019-11-03 LAB
CULTURE RESULTS: SIGNIFICANT CHANGE UP
CULTURE RESULTS: SIGNIFICANT CHANGE UP
SPECIMEN SOURCE: SIGNIFICANT CHANGE UP
SPECIMEN SOURCE: SIGNIFICANT CHANGE UP

## 2019-11-06 DIAGNOSIS — Z90.49 ACQUIRED ABSENCE OF OTHER SPECIFIED PARTS OF DIGESTIVE TRACT: ICD-10-CM

## 2019-11-06 DIAGNOSIS — I10 ESSENTIAL (PRIMARY) HYPERTENSION: ICD-10-CM

## 2019-11-06 DIAGNOSIS — A41.9 SEPSIS, UNSPECIFIED ORGANISM: ICD-10-CM

## 2019-11-06 DIAGNOSIS — N17.0 ACUTE KIDNEY FAILURE WITH TUBULAR NECROSIS: ICD-10-CM

## 2019-11-06 DIAGNOSIS — B96.20 UNSPECIFIED ESCHERICHIA COLI [E. COLI] AS THE CAUSE OF DISEASES CLASSIFIED ELSEWHERE: ICD-10-CM

## 2019-11-06 DIAGNOSIS — T36.95XA ADVERSE EFFECT OF UNSPECIFIED SYSTEMIC ANTIBIOTIC, INITIAL ENCOUNTER: ICD-10-CM

## 2019-11-06 DIAGNOSIS — K52.1 TOXIC GASTROENTERITIS AND COLITIS: ICD-10-CM

## 2019-11-06 DIAGNOSIS — L40.50 ARTHROPATHIC PSORIASIS, UNSPECIFIED: ICD-10-CM

## 2019-11-06 DIAGNOSIS — Z98.84 BARIATRIC SURGERY STATUS: ICD-10-CM

## 2019-11-06 DIAGNOSIS — N39.0 URINARY TRACT INFECTION, SITE NOT SPECIFIED: ICD-10-CM

## 2019-11-06 DIAGNOSIS — K21.9 GASTRO-ESOPHAGEAL REFLUX DISEASE WITHOUT ESOPHAGITIS: ICD-10-CM

## 2019-11-06 DIAGNOSIS — Z16.29 RESISTANCE TO OTHER SINGLE SPECIFIED ANTIBIOTIC: ICD-10-CM

## 2019-11-06 DIAGNOSIS — E87.1 HYPO-OSMOLALITY AND HYPONATREMIA: ICD-10-CM

## 2019-11-06 DIAGNOSIS — E03.9 HYPOTHYROIDISM, UNSPECIFIED: ICD-10-CM

## 2019-11-06 DIAGNOSIS — E86.1 HYPOVOLEMIA: ICD-10-CM

## 2020-11-04 PROBLEM — I10 ESSENTIAL (PRIMARY) HYPERTENSION: Chronic | Status: ACTIVE | Noted: 2019-10-29

## 2020-11-18 ENCOUNTER — APPOINTMENT (OUTPATIENT)
Dept: HEMATOLOGY ONCOLOGY | Facility: CLINIC | Age: 55
End: 2020-11-18
Payer: COMMERCIAL

## 2020-11-18 ENCOUNTER — OUTPATIENT (OUTPATIENT)
Dept: OUTPATIENT SERVICES | Facility: HOSPITAL | Age: 55
LOS: 1 days | Discharge: HOME | End: 2020-11-18

## 2020-11-18 VITALS
TEMPERATURE: 97.1 F | HEART RATE: 72 BPM | BODY MASS INDEX: 39.53 KG/M2 | SYSTOLIC BLOOD PRESSURE: 144 MMHG | WEIGHT: 244.9 LBS | DIASTOLIC BLOOD PRESSURE: 77 MMHG

## 2020-11-18 DIAGNOSIS — E83.110 HEREDITARY HEMOCHROMATOSIS: ICD-10-CM

## 2020-11-18 PROCEDURE — 99203 OFFICE O/P NEW LOW 30 MIN: CPT

## 2020-11-18 RX ORDER — APREMILAST 30 MG/1
30 TABLET, FILM COATED ORAL
Qty: 60 | Refills: 1 | Status: DISCONTINUED | COMMUNITY
Start: 2019-04-09 | End: 2020-11-18

## 2020-11-19 NOTE — CONSULT LETTER
[Dear  ___] : Dear ~RALPH, [( Thank you for referring [unfilled] for consultation for _____ )] : Thank you for referring [unfilled] for consultation for [unfilled] [Please see my note below.] : Please see my note below. [Consult Closing:] : Thank you very much for allowing me to participate in the care of this patient.  If you have any questions, please do not hesitate to contact me. [Sincerely,] : Sincerely, [FreeTextEntry2] : Dr Carol Hidalgo [FreeTextEntry3] : Kalli Hernandez

## 2020-11-19 NOTE — ASSESSMENT
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 Her ferritin and  iron saturation are within  normal range. No clinical conditions associated with iron overload.\par At present time she does not have clinical syndrome of hemochromatosis.\par Majority of patients with compound heterozygosity for C282Y and H63D are hemochromatosis gene carriers and  never develop iron overload  but in some patients clinical hemochromatosis will develop due to  individual variation in gene penetrance.\par \par She will need periodic monitoring - yearly iron studies: ferritin/ iron/ TIBC/iron sat , more frequently if needed. \par She will need phlebotomies if iron saturation > 50 % or ferritin above normal range. \par \par Her hematocrit was slightly elevated on recent blood work- probably due to poor fluid intake but  if persisitently elevated in the future- she will need evaluation.\par \par Above discussed with the patient and she is comfortable with the plan. She will continue to follow up with her primary physician Dr Hidalgo for annual blood work monitoring, return visit here PRN.

## 2020-11-19 NOTE — RESULTS/DATA
[FreeTextEntry1] : reviewed labs  Oct 2020   ferritin 166 iron sat 38 %      H/H 15.5/ 50.8    LFT's normal

## 2020-11-19 NOTE — HISTORY OF PRESENT ILLNESS
[de-identified] : 56 y/o female  with abnormal  hereditary hemochromatosis gene testing: compound heterozygote for C282Y and H63D mutations. She was tested because her 24 y/o son was found to have elevated ferritin and iron saturation and diagnosed with hereditary hemochromatosis. Her  was also tested and he is a carrier of HH gene.\par \par She  has hx of heavy menses , which stopped 5 years ago after she had uterine ablation. She does not donate blood. She has  chronic mild arthralgias. Recently she developed numbness/ tingling in her hands and feet and she is being evaluated by neurology.

## 2020-11-19 NOTE — REVIEW OF SYSTEMS
[Patient Intake Form Reviewed] : Patient intake form was reviewed [Joint Pain] : joint pain [Joint Stiffness] : joint stiffness [Hot Flashes] : hot flashes [Negative] : Psychiatric [FreeTextEntry2] : weight gain  [de-identified] : numbness/ tingling

## 2020-11-19 NOTE — END OF VISIT
[Time Spent: ___ minutes] : I have spent [unfilled] minutes of time on the encounter. [>50% of the face to face encounter time was spent on counseling and/or coordination of care for ___] : Greater than 50% of the face to face encounter time was spent on counseling and/or coordination of care for [unfilled] covid/Other specify

## 2021-04-12 ENCOUNTER — RX RENEWAL (OUTPATIENT)
Age: 56
End: 2021-04-12

## 2021-04-12 DIAGNOSIS — J30.9 ALLERGIC RHINITIS, UNSPECIFIED: ICD-10-CM

## 2021-06-07 ENCOUNTER — TRANSCRIPTION ENCOUNTER (OUTPATIENT)
Age: 56
End: 2021-06-07

## 2021-06-08 ENCOUNTER — RX RENEWAL (OUTPATIENT)
Age: 56
End: 2021-06-08

## 2021-06-15 ENCOUNTER — APPOINTMENT (OUTPATIENT)
Dept: OTOLARYNGOLOGY | Facility: CLINIC | Age: 56
End: 2021-06-15

## 2021-08-02 ENCOUNTER — APPOINTMENT (OUTPATIENT)
Dept: FAMILY MEDICINE | Facility: CLINIC | Age: 56
End: 2021-08-02

## 2021-08-13 ENCOUNTER — APPOINTMENT (OUTPATIENT)
Dept: FAMILY MEDICINE | Facility: CLINIC | Age: 56
End: 2021-08-13
Payer: COMMERCIAL

## 2021-08-13 VITALS
HEART RATE: 44 BPM | BODY MASS INDEX: 38.57 KG/M2 | HEIGHT: 66 IN | WEIGHT: 240 LBS | SYSTOLIC BLOOD PRESSURE: 132 MMHG | TEMPERATURE: 97.8 F | OXYGEN SATURATION: 98 % | DIASTOLIC BLOOD PRESSURE: 78 MMHG

## 2021-08-13 DIAGNOSIS — H57.89 OTHER SPECIFIED DISORDERS OF EYE AND ADNEXA: ICD-10-CM

## 2021-08-13 PROCEDURE — 99213 OFFICE O/P EST LOW 20 MIN: CPT

## 2021-08-13 NOTE — PHYSICAL EXAM
[Normal] : affect was normal and insight and judgment were intact [de-identified] : lid edema left upper eyelid, palpable deformity to the upper lid.  no purulent drainage, mild injection to the conjunctiva.

## 2021-08-13 NOTE — PLAN
[FreeTextEntry1] : Patient will use the Augmentin as prescribed, and will also use the tobramycin drops as prescribed as well.  She will avoid any rubbing of her eyes, and will use warm compresses and lid washes.  If there is no improvement, she will then follow up with ophtho.

## 2021-08-13 NOTE — HISTORY OF PRESENT ILLNESS
[FreeTextEntry8] : Patient complains of a sty to the left eye x 1.5 weeks.  She is using warm compresses and tobramycin drops she was given in the past.  She did have one that improved on the other eye a week ago, but this has been persisting.  She does not have severe pain, but she has some discomfort.  She denies any change in her vision.

## 2021-08-31 ENCOUNTER — NON-APPOINTMENT (OUTPATIENT)
Age: 56
End: 2021-08-31

## 2021-08-31 ENCOUNTER — TRANSCRIPTION ENCOUNTER (OUTPATIENT)
Age: 56
End: 2021-08-31

## 2021-09-02 ENCOUNTER — APPOINTMENT (OUTPATIENT)
Dept: DISASTER EMERGENCY | Facility: HOSPITAL | Age: 56
End: 2021-09-02

## 2021-09-02 ENCOUNTER — OUTPATIENT (OUTPATIENT)
Dept: OUTPATIENT SERVICES | Facility: HOSPITAL | Age: 56
LOS: 1 days | End: 2021-09-02
Payer: COMMERCIAL

## 2021-09-02 VITALS
OXYGEN SATURATION: 96 % | WEIGHT: 240.08 LBS | DIASTOLIC BLOOD PRESSURE: 56 MMHG | SYSTOLIC BLOOD PRESSURE: 115 MMHG | TEMPERATURE: 99 F | RESPIRATION RATE: 18 BRPM | HEIGHT: 66 IN | HEART RATE: 70 BPM

## 2021-09-02 VITALS
DIASTOLIC BLOOD PRESSURE: 68 MMHG | OXYGEN SATURATION: 97 % | RESPIRATION RATE: 20 BRPM | HEART RATE: 65 BPM | TEMPERATURE: 98 F | SYSTOLIC BLOOD PRESSURE: 106 MMHG

## 2021-09-02 DIAGNOSIS — U07.1 COVID-19: ICD-10-CM

## 2021-09-02 PROCEDURE — M0243: CPT

## 2021-09-02 RX ORDER — SODIUM CHLORIDE 9 MG/ML
250 INJECTION INTRAMUSCULAR; INTRAVENOUS; SUBCUTANEOUS
Refills: 0 | Status: COMPLETED | OUTPATIENT
Start: 2021-09-02 | End: 2021-09-02

## 2021-09-02 RX ADMIN — SODIUM CHLORIDE 310 MILLILITER(S): 9 INJECTION INTRAMUSCULAR; INTRAVENOUS; SUBCUTANEOUS at 12:30

## 2021-09-02 NOTE — CHART NOTE - NSCHARTNOTEFT_GEN_A_CORE
CC: Monoclonal Antibody Infusion/COVID 19 Positive      History: Patient presents for infusion of monoclonal antibody infusion. Patient has been screened and was deemed to be a candidate.    Symptoms/ Criteria: Fever, cough, loss of smell and taste, body aches, fatigue, diarrhea    Inclusion Criteria: Age> 55, HTN  Positive Test Date: 8/29/21  Date of Symptom Onset: 8/28/21        PMHx:  Infection due to severe acute respiratory syndrome coronavirus 2 (SARS-CoV-2)    HTN (hypertension)    HTN (hypertension)          T(C): 36.9 (09-02-21 @ 13:00), Max: 37.2 (09-02-21 @ 12:43)  HR: 65 (09-02-21 @ 13:00) (65 - 70)  BP: 106/68 (09-02-21 @ 13:00) (96/56 - 115/56)  RR: 20 (09-02-21 @ 13:00) (18 - 20)  SpO2: 97% (09-02-21 @ 13:00) (94% - 97%)      PE:   Appearance: NAD	  HEENT:   Normal oral mucosa.   Lymphatic: No lymphadenopathy  Cardiovascular: Normal S1 S2, No JVD, No murmurs, No edema  Respiratory: Lungs clear to auscultation	  Gastrointestinal:  Soft, Non-tender. No guarding   Skin: warm and dry  Neurologic: Non-focal  Extremities: Normal range of motion.     ASSESSMENT:  Pt is a 56y year old Female with PMH Covid +  referred by Bridgette Hidalgo to the infusion center for Monoclonal antibody infusion (Regeneron).        PLAN:  - infusion procedure explained to patient   - Consent for monoclonal antibody infusion obtained   - Risk & benefits discussed/all questions answered  - infuse Regeneron over twenty-one minutes   - will observe patient for one hour post infusion  and then if stable discharge home with oupt follow up as planned by PMD.    POST INFUSION ASSESSMENT:   DISCHARGE at approximately  14:10  hours    - Patient tolerated infusion well denies complaints of chest pain/SOB/dizziness/ palps  - VSS for discharge home  - D/C instructions given/ fact sheet included.  - Patient to follow-up with PCP as needed.

## 2021-09-16 ENCOUNTER — RESULT CHARGE (OUTPATIENT)
Age: 56
End: 2021-09-16

## 2021-09-16 NOTE — PHYSICAL EXAM
[No Acute Distress] : no acute distress [Well Nourished] : well nourished [Well Developed] : well developed [Well-Appearing] : well-appearing [PERRL] : pupils equal round and reactive to light [Normal Sclera/Conjunctiva] : normal sclera/conjunctiva [EOMI] : extraocular movements intact [Normal Outer Ear/Nose] : the outer ears and nose were normal in appearance [Normal Oropharynx] : the oropharynx was normal [No JVD] : no jugular venous distention [No Lymphadenopathy] : no lymphadenopathy [Supple] : supple [Thyroid Normal, No Nodules] : the thyroid was normal and there were no nodules present [No Respiratory Distress] : no respiratory distress  [No Accessory Muscle Use] : no accessory muscle use [Clear to Auscultation] : lungs were clear to auscultation bilaterally [Normal Rate] : normal rate  [Regular Rhythm] : with a regular rhythm [Normal S1, S2] : normal S1 and S2 [No Murmur] : no murmur heard [No Carotid Bruits] : no carotid bruits [No Abdominal Bruit] : a ~M bruit was not heard ~T in the abdomen [No Varicosities] : no varicosities [Pedal Pulses Present] : the pedal pulses are present [No Edema] : there was no peripheral edema [No Palpable Aorta] : no palpable aorta [No Extremity Clubbing/Cyanosis] : no extremity clubbing/cyanosis [Soft] : abdomen soft [Non Tender] : non-tender [Non-distended] : non-distended [No Masses] : no abdominal mass palpated [No HSM] : no HSM [Normal Bowel Sounds] : normal bowel sounds [Normal Anterior Cervical Nodes] : no anterior cervical lymphadenopathy [Normal Posterior Cervical Nodes] : no posterior cervical lymphadenopathy [No CVA Tenderness] : no CVA  tenderness [No Spinal Tenderness] : no spinal tenderness [No Joint Swelling] : no joint swelling [Grossly Normal Strength/Tone] : grossly normal strength/tone [No Rash] : no rash [Coordination Grossly Intact] : coordination grossly intact [No Focal Deficits] : no focal deficits [Normal Gait] : normal gait [Deep Tendon Reflexes (DTR)] : deep tendon reflexes were 2+ and symmetric [Normal Affect] : the affect was normal [Normal Insight/Judgement] : insight and judgment were intact

## 2021-09-17 ENCOUNTER — NON-APPOINTMENT (OUTPATIENT)
Age: 56
End: 2021-09-17

## 2021-09-17 ENCOUNTER — APPOINTMENT (OUTPATIENT)
Dept: FAMILY MEDICINE | Facility: CLINIC | Age: 56
End: 2021-09-17
Payer: COMMERCIAL

## 2021-09-17 VITALS
HEIGHT: 66 IN | OXYGEN SATURATION: 97 % | SYSTOLIC BLOOD PRESSURE: 128 MMHG | BODY MASS INDEX: 38.57 KG/M2 | RESPIRATION RATE: 16 BRPM | HEART RATE: 98 BPM | TEMPERATURE: 98 F | DIASTOLIC BLOOD PRESSURE: 82 MMHG | WEIGHT: 240 LBS

## 2021-09-17 DIAGNOSIS — Z00.00 ENCOUNTER FOR GENERAL ADULT MEDICAL EXAMINATION W/OUT ABNORMAL FINDINGS: ICD-10-CM

## 2021-09-17 PROCEDURE — 93000 ELECTROCARDIOGRAM COMPLETE: CPT

## 2021-09-17 PROCEDURE — 99396 PREV VISIT EST AGE 40-64: CPT | Mod: 25

## 2021-09-17 RX ORDER — VALSARTAN AND HYDROCHLOROTHIAZIDE 160; 12.5 MG/1; MG/1
160-12.5 TABLET, FILM COATED ORAL
Qty: 90 | Refills: 0 | Status: COMPLETED | COMMUNITY
Start: 2021-06-08 | End: 2021-09-17

## 2021-09-17 RX ORDER — TOBRAMYCIN 3 MG/ML
0.3 SOLUTION/ DROPS OPHTHALMIC
Qty: 5 | Refills: 0 | Status: DISCONTINUED | COMMUNITY
Start: 2021-08-13 | End: 2021-09-17

## 2021-09-17 RX ORDER — AMOXICILLIN AND CLAVULANATE POTASSIUM 875; 125 MG/1; MG/1
875-125 TABLET, COATED ORAL
Qty: 20 | Refills: 0 | Status: DISCONTINUED | COMMUNITY
Start: 2021-08-13 | End: 2021-09-17

## 2021-09-17 NOTE — HEALTH RISK ASSESSMENT
[None] : None [With Family] : lives with family [Employed] : employed [] :  [Sexually Active] : sexually active [Feels Safe at Home] : Feels safe at home [Reports normal functional visual acuity (ie: able to read med bottle)] : Reports normal functional visual acuity [Smoke Detector] : smoke detector [Carbon Monoxide Detector] : carbon monoxide detector [Safety elements used in home] : safety elements used in home [Seat Belt] :  uses seat belt [Sunscreen] : uses sunscreen [Patient reported mammogram was normal] : Patient reported mammogram was normal [Patient reported colonoscopy was normal] : Patient reported colonoscopy was normal [Change in mental status noted] : No change in mental status noted [High Risk Behavior] : no high risk behavior [Reports changes in vision] : Reports no changes in vision [Reports changes in hearing] : Reports no changes in hearing [Reports changes in dental health] : Reports no changes in dental health [Guns at Home] : no guns at home [Travel to Developing Areas] : does not  travel to developing areas [TB Exposure] : is not being exposed to tuberculosis [MammogramDate] : 2020 [ColonoscopyDate] : 2016

## 2021-09-17 NOTE — HISTORY OF PRESENT ILLNESS
[FreeTextEntry1] : Routine PE [de-identified] : Patient's last PE was 1 year ago.  She follows a normal diet, and she does exercise when possible.  She is utd with all vaccinations.  Patient is currently utd with ophtho and derm, and will follow up with the dentist in the near future.  She does feel well overall.\par \par She was positive for COVID 19 8/2021, and did have monoclonal Ab.  She has healed well, but she does have some residual loss of taste and smell and a slight cough.

## 2021-09-17 NOTE — PLAN
[FreeTextEntry1] : Patient is advised to continue with her current diet and exercise, along with her current rx management. She  is also advised to make sure that she follows up with the ophthalmologist and dentist annually which she does do. She is also advised to make sure to also follow up with the dermatologist for routine skin checks.  She is also advised to make sure that she exercises when possible, and follows up for any medical issues that arise. Her labs will be checked as noted above, and further med adjustments will be made at that time.  All vaccines are currently utd.\par \par Flu shot deferred today.\par \par Patient agrees to have her bone density and mammo now, and she will follow up with GI for her colonoscopy as well.

## 2021-09-17 NOTE — ASSESSMENT
[FreeTextEntry1] : ECG- frequent PVC and bigeminy noted.  She is due for follow up with cardio, and will schedule in the near future.  asymptomatic.

## 2021-09-30 ENCOUNTER — TRANSCRIPTION ENCOUNTER (OUTPATIENT)
Age: 56
End: 2021-09-30

## 2021-09-30 LAB
25(OH)D3 SERPL-MCNC: 56.7 NG/ML
ALBUMIN SERPL ELPH-MCNC: 4 G/DL
ALP BLD-CCNC: 86 U/L
ALT SERPL-CCNC: 22 U/L
ANION GAP SERPL CALC-SCNC: 12 MMOL/L
AST SERPL-CCNC: 17 U/L
BASOPHILS # BLD AUTO: 0.1 K/UL
BASOPHILS NFR BLD AUTO: 0.8 %
BILIRUB SERPL-MCNC: 0.4 MG/DL
BUN SERPL-MCNC: 19 MG/DL
CALCIUM SERPL-MCNC: 9.7 MG/DL
CHLORIDE SERPL-SCNC: 102 MMOL/L
CHOLEST SERPL-MCNC: 172 MG/DL
CO2 SERPL-SCNC: 28 MMOL/L
CREAT SERPL-MCNC: 0.86 MG/DL
EOSINOPHIL # BLD AUTO: 0.32 K/UL
EOSINOPHIL NFR BLD AUTO: 2.7 %
ESTIMATED AVERAGE GLUCOSE: 120 MG/DL
GLUCOSE SERPL-MCNC: 118 MG/DL
HBA1C MFR BLD HPLC: 5.8 %
HCT VFR BLD CALC: 46.6 %
HDLC SERPL-MCNC: 45 MG/DL
HGB BLD-MCNC: 15.1 G/DL
IMM GRANULOCYTES NFR BLD AUTO: 1 %
LDLC SERPL CALC-MCNC: 95 MG/DL
LYMPHOCYTES # BLD AUTO: 3.3 K/UL
LYMPHOCYTES NFR BLD AUTO: 28 %
MAN DIFF?: NORMAL
MCHC RBC-ENTMCNC: 29.7 PG
MCHC RBC-ENTMCNC: 32.4 GM/DL
MCV RBC AUTO: 91.7 FL
MONOCYTES # BLD AUTO: 0.76 K/UL
MONOCYTES NFR BLD AUTO: 6.4 %
NEUTROPHILS # BLD AUTO: 7.2 K/UL
NEUTROPHILS NFR BLD AUTO: 61.1 %
NONHDLC SERPL-MCNC: 127 MG/DL
PLATELET # BLD AUTO: 272 K/UL
POTASSIUM SERPL-SCNC: 5 MMOL/L
PROT SERPL-MCNC: 6.4 G/DL
RBC # BLD: 5.08 M/UL
RBC # FLD: 13.2 %
SODIUM SERPL-SCNC: 142 MMOL/L
T4 FREE SERPL-MCNC: 1.3 NG/DL
TRIGL SERPL-MCNC: 161 MG/DL
TSH SERPL-ACNC: 3.35 UIU/ML
WBC # FLD AUTO: 11.8 K/UL

## 2021-10-08 ENCOUNTER — RESULT REVIEW (OUTPATIENT)
Age: 56
End: 2021-10-08

## 2021-10-08 ENCOUNTER — APPOINTMENT (OUTPATIENT)
Dept: MAMMOGRAPHY | Facility: CLINIC | Age: 56
End: 2021-10-08
Payer: COMMERCIAL

## 2021-10-08 ENCOUNTER — APPOINTMENT (OUTPATIENT)
Dept: ULTRASOUND IMAGING | Facility: CLINIC | Age: 56
End: 2021-10-08
Payer: COMMERCIAL

## 2021-10-08 ENCOUNTER — APPOINTMENT (OUTPATIENT)
Dept: RADIOLOGY | Facility: CLINIC | Age: 56
End: 2021-10-08
Payer: COMMERCIAL

## 2021-10-08 ENCOUNTER — OUTPATIENT (OUTPATIENT)
Dept: OUTPATIENT SERVICES | Facility: HOSPITAL | Age: 56
LOS: 1 days | End: 2021-10-08
Payer: COMMERCIAL

## 2021-10-08 DIAGNOSIS — Z00.00 ENCOUNTER FOR GENERAL ADULT MEDICAL EXAMINATION WITHOUT ABNORMAL FINDINGS: ICD-10-CM

## 2021-10-08 DIAGNOSIS — Z00.8 ENCOUNTER FOR OTHER GENERAL EXAMINATION: ICD-10-CM

## 2021-10-08 PROCEDURE — 76641 ULTRASOUND BREAST COMPLETE: CPT | Mod: 26,50

## 2021-10-08 PROCEDURE — 77067 SCR MAMMO BI INCL CAD: CPT | Mod: 26

## 2021-10-08 PROCEDURE — 77080 DXA BONE DENSITY AXIAL: CPT | Mod: 26

## 2021-10-08 PROCEDURE — 77063 BREAST TOMOSYNTHESIS BI: CPT | Mod: 26

## 2021-10-08 PROCEDURE — 77063 BREAST TOMOSYNTHESIS BI: CPT

## 2021-10-08 PROCEDURE — 76641 ULTRASOUND BREAST COMPLETE: CPT

## 2021-10-08 PROCEDURE — 77067 SCR MAMMO BI INCL CAD: CPT

## 2021-10-08 PROCEDURE — 77080 DXA BONE DENSITY AXIAL: CPT

## 2021-10-11 ENCOUNTER — TRANSCRIPTION ENCOUNTER (OUTPATIENT)
Age: 56
End: 2021-10-11

## 2021-10-21 ENCOUNTER — TRANSCRIPTION ENCOUNTER (OUTPATIENT)
Age: 56
End: 2021-10-21

## 2021-11-05 ENCOUNTER — TRANSCRIPTION ENCOUNTER (OUTPATIENT)
Age: 56
End: 2021-11-05

## 2021-12-10 LAB
BASOPHILS # BLD AUTO: 0.07 K/UL
BASOPHILS NFR BLD AUTO: 0.7 %
EOSINOPHIL # BLD AUTO: 0.26 K/UL
EOSINOPHIL NFR BLD AUTO: 2.5 %
HCT VFR BLD CALC: 44.7 %
HGB BLD-MCNC: 14.9 G/DL
IMM GRANULOCYTES NFR BLD AUTO: 0.7 %
LYMPHOCYTES # BLD AUTO: 2.48 K/UL
LYMPHOCYTES NFR BLD AUTO: 24 %
MAN DIFF?: NORMAL
MCHC RBC-ENTMCNC: 30.2 PG
MCHC RBC-ENTMCNC: 33.3 GM/DL
MCV RBC AUTO: 90.5 FL
MONOCYTES # BLD AUTO: 0.61 K/UL
MONOCYTES NFR BLD AUTO: 5.9 %
NEUTROPHILS # BLD AUTO: 6.83 K/UL
NEUTROPHILS NFR BLD AUTO: 66.2 %
PLATELET # BLD AUTO: 257 K/UL
RBC # BLD: 4.94 M/UL
RBC # FLD: 12.6 %
WBC # FLD AUTO: 10.32 K/UL

## 2021-12-11 LAB
COVID-19 SPIKE DOMAIN ANTIBODY INTERPRETATION: POSITIVE
FERRITIN SERPL-MCNC: 177 NG/ML
IRON SATN MFR SERPL: 33 %
IRON SERPL-MCNC: 83 UG/DL
SARS-COV-2 AB SERPL IA-ACNC: >250 U/ML
TIBC SERPL-MCNC: 252 UG/DL
UIBC SERPL-MCNC: 170 UG/DL

## 2021-12-17 ENCOUNTER — APPOINTMENT (OUTPATIENT)
Dept: ELECTROPHYSIOLOGY | Facility: CLINIC | Age: 56
End: 2021-12-17

## 2021-12-22 ENCOUNTER — APPOINTMENT (OUTPATIENT)
Dept: ELECTROPHYSIOLOGY | Facility: CLINIC | Age: 56
End: 2021-12-22
Payer: COMMERCIAL

## 2021-12-22 VITALS
TEMPERATURE: 98.7 F | RESPIRATION RATE: 17 BRPM | HEART RATE: 76 BPM | DIASTOLIC BLOOD PRESSURE: 74 MMHG | WEIGHT: 243 LBS | OXYGEN SATURATION: 98 % | BODY MASS INDEX: 39.05 KG/M2 | HEIGHT: 66 IN | SYSTOLIC BLOOD PRESSURE: 150 MMHG

## 2021-12-22 VITALS — DIASTOLIC BLOOD PRESSURE: 78 MMHG | SYSTOLIC BLOOD PRESSURE: 152 MMHG

## 2021-12-22 DIAGNOSIS — Z86.79 PERSONAL HISTORY OF OTHER DISEASES OF THE CIRCULATORY SYSTEM: ICD-10-CM

## 2021-12-22 DIAGNOSIS — I49.3 VENTRICULAR PREMATURE DEPOLARIZATION: ICD-10-CM

## 2021-12-22 DIAGNOSIS — U07.1 COVID-19: ICD-10-CM

## 2021-12-22 DIAGNOSIS — Z86.39 PERSONAL HISTORY OF OTHER ENDOCRINE, NUTRITIONAL AND METABOLIC DISEASE: ICD-10-CM

## 2021-12-22 DIAGNOSIS — Z82.49 FAMILY HISTORY OF ISCHEMIC HEART DISEASE AND OTHER DISEASES OF THE CIRCULATORY SYSTEM: ICD-10-CM

## 2021-12-22 PROCEDURE — 99204 OFFICE O/P NEW MOD 45 MIN: CPT

## 2021-12-22 RX ORDER — CARVEDILOL 6.25 MG/1
6.25 TABLET, FILM COATED ORAL
Qty: 90 | Refills: 1 | Status: DISCONTINUED | COMMUNITY
Start: 1900-01-01 | End: 2021-12-22

## 2022-01-18 ENCOUNTER — APPOINTMENT (OUTPATIENT)
Dept: INTERNAL MEDICINE | Facility: CLINIC | Age: 57
End: 2022-01-18
Payer: COMMERCIAL

## 2022-01-18 VITALS
HEART RATE: 60 BPM | WEIGHT: 249 LBS | RESPIRATION RATE: 16 BRPM | OXYGEN SATURATION: 97 % | DIASTOLIC BLOOD PRESSURE: 76 MMHG | TEMPERATURE: 98.3 F | BODY MASS INDEX: 40.02 KG/M2 | SYSTOLIC BLOOD PRESSURE: 144 MMHG | HEIGHT: 66 IN

## 2022-01-18 DIAGNOSIS — H01.003 UNSPECIFIED BLEPHARITIS RIGHT EYE, UNSPECIFIED EYELID: ICD-10-CM

## 2022-01-18 DIAGNOSIS — Z23 ENCOUNTER FOR IMMUNIZATION: ICD-10-CM

## 2022-01-18 DIAGNOSIS — H00.011 HORDEOLUM EXTERNUM RIGHT UPPER EYELID: ICD-10-CM

## 2022-01-18 PROCEDURE — 99214 OFFICE O/P EST MOD 30 MIN: CPT

## 2022-01-19 NOTE — PHYSICAL EXAM
[Normal] : normal rate, regular rhythm, normal S1 and S2 and no murmur heard [de-identified] : +swelling of the right upper eye lid. + crust in the eye lashes.

## 2022-01-19 NOTE — HISTORY OF PRESENT ILLNESS
[FreeTextEntry8] : Ms. LAN CARDOZO  is    56 year female , presents today with a complaint of blepharitis in the right upper eyelid.\par She also has an external stye on right upper eyelid.\par Denied any visual changes, no pain.\par She also complains of some crust in the left eye lid.\par HTN she is on carvedilol 12. 5 mg ,valsartan/hctz 160/12.5 mg.\par hypothyoroid on levothyroxine 50 mcq.\par

## 2022-01-19 NOTE — ASSESSMENT
[FreeTextEntry1] : prescribed  doxycycline 100 mg bid for 5 days.\par gentamicin eye ointment apply 3 time s a day\par warm water compression.\par \par HTN:\par Blood pressure well controlled with valsartan-HCTZ 160 12.5 mg and carvedilol 12.5 mg BID.\par \par Hypothyroid:\par Currently euthyroid on levothyroxine 50 mcg.-

## 2022-01-19 NOTE — REVIEW OF SYSTEMS
[Discharge] : discharge [Pain] : pain [Dryness] : dryness  [Negative] : ENT [Redness] : no redness [Vision Problems] : no vision problems [Itching] : no itching

## 2022-01-21 PROBLEM — I49.3 PVC (PREMATURE VENTRICULAR CONTRACTION): Status: ACTIVE | Noted: 2021-12-22

## 2022-01-21 NOTE — DISCUSSION/SUMMARY
[FreeTextEntry1] : In summary, Ms. Crowell is a 56 year old female with a long history of palpitations and recent COVID infection. She also complains of "fluttering" in addition to baseline palpitations. She wore an outpatient monitor recently which showed a PVC burden of 28% - likely cause of longstanding palpitations. Symptoms of fluttering may be secondary to the non-sustained AT noted on monitoring. We discussed various therapeutic options for the treatment of PVCs including medications and catheter ablation. Ablation for high burden symptomatic despite use of beta-blockers would be the preferrable approach. The patient prefers conservative therapy at the moment. I will increase the dose of coreg - if she continues to have a high burden of symptomatic PVCs ablation should be considered next.

## 2022-01-21 NOTE — CARDIOLOGY SUMMARY
[de-identified] : 9/17/21: sinus rhythm, frequent PVCs (LBBB morphology, V5 transition, left superior axis) [de-identified] : 6/3/2020: Pharmacologic nuclear - negative for ischemia [de-identified] : 5/28/20: EF 60-65%, mild MR

## 2022-01-21 NOTE — HISTORY OF PRESENT ILLNESS
[FreeTextEntry1] : Ms. Crowell is a pleasant 56 year old female here for arrhythmia management. She has a long history of palpitations. Additionally, she was recently diagnosed with COVID infection. She has been feeling fatigued since then and has also noticed occasional fluttering in her chest in addition to the baseline palpitations.  She has been on coreg in the past for palpitations. She recently wore an outpatient monitor which showed a 28% PVC burden and few episodes of non-sustained atrial tachycardia. Last TTE demonstrated an of EF 60-65%. Stress test was negative for ischemia. In addition, she has a history of HTN, hypothyroidism, morbid obesity s/p bariatric surgery, and psoriasis.

## 2022-02-02 ENCOUNTER — APPOINTMENT (OUTPATIENT)
Dept: ELECTROPHYSIOLOGY | Facility: CLINIC | Age: 57
End: 2022-02-02

## 2022-03-02 ENCOUNTER — FORM ENCOUNTER (OUTPATIENT)
Age: 57
End: 2022-03-02

## 2022-03-10 ENCOUNTER — NON-APPOINTMENT (OUTPATIENT)
Age: 57
End: 2022-03-10

## 2022-03-18 ENCOUNTER — TRANSCRIPTION ENCOUNTER (OUTPATIENT)
Age: 57
End: 2022-03-18

## 2022-03-18 ENCOUNTER — NON-APPOINTMENT (OUTPATIENT)
Age: 57
End: 2022-03-18

## 2022-04-04 ENCOUNTER — APPOINTMENT (OUTPATIENT)
Dept: FAMILY MEDICINE | Facility: CLINIC | Age: 57
End: 2022-04-04
Payer: COMMERCIAL

## 2022-04-04 PROCEDURE — 36415 COLL VENOUS BLD VENIPUNCTURE: CPT

## 2022-04-05 LAB — TSH SERPL-ACNC: 2.51 UIU/ML

## 2022-04-07 ENCOUNTER — NON-APPOINTMENT (OUTPATIENT)
Age: 57
End: 2022-04-07

## 2022-05-05 ENCOUNTER — APPOINTMENT (OUTPATIENT)
Dept: VASCULAR SURGERY | Facility: CLINIC | Age: 57
End: 2022-05-05

## 2022-05-12 ENCOUNTER — APPOINTMENT (OUTPATIENT)
Dept: VASCULAR SURGERY | Facility: CLINIC | Age: 57
End: 2022-05-12
Payer: COMMERCIAL

## 2022-05-12 VITALS — OXYGEN SATURATION: 99 % | DIASTOLIC BLOOD PRESSURE: 70 MMHG | SYSTOLIC BLOOD PRESSURE: 123 MMHG | HEART RATE: 64 BPM

## 2022-05-12 DIAGNOSIS — I87.2 VENOUS INSUFFICIENCY (CHRONIC) (PERIPHERAL): ICD-10-CM

## 2022-05-12 DIAGNOSIS — I83.813 VARICOSE VEINS OF BILATERAL LOWER EXTREMITIES WITH PAIN: ICD-10-CM

## 2022-05-12 PROCEDURE — 93970 EXTREMITY STUDY: CPT

## 2022-05-12 PROCEDURE — 99203 OFFICE O/P NEW LOW 30 MIN: CPT

## 2022-05-12 NOTE — HISTORY OF PRESENT ILLNESS
[FreeTextEntry1] : 58 yo F with history of HTN, hypothyroidism, arthritis, CAD, morbid obesity s/p lap band and CLAUDY presenting with bilateral LE discomfort, heaviness, swelling, fatigue, and large painful varicose veins.  Denies history of DVT or SVT or trauma. Pt has worn compression stockings for past 3 months and elevated legs without improvement. Denies recent weight gain. No history of hypercoagulable disorder. Denies claudication or rest pain or ulcers. Pt exercises regularly with moderate walking. Denies chest pain, SOB, dyspnea on exertion, or orthopnea.\par

## 2022-05-12 NOTE — PHYSICAL EXAM
[Normal Rate and Rhythm] : normal rate and rhythm [2+] : left 2+ [Ankle Swelling (On Exam)] : present [Ankle Swelling Bilaterally] : bilaterally  [Varicose Veins Of Lower Extremities] : bilaterally [] : bilaterally [Ankle Swelling On The Left] : moderate [Abdomen Tenderness] : ~T ~M No abdominal tenderness [No Rash or Lesion] : No rash or lesion [Alert] : alert [Oriented to Person] : oriented to person [Oriented to Place] : oriented to place [Oriented to Time] : oriented to time [Calm] : calm [de-identified] : NAD [de-identified] : supple, no masses [de-identified] : FROM of all 4 extremities\par  [de-identified] : unlabored breathing

## 2022-05-12 NOTE — ASSESSMENT
[FreeTextEntry1] : 58 yo F with history of HTN, hypothyroidism, arthritis, CAD and CLAUDY presenting with bilateral LE discomfort, heaviness, swelling, fatigue, and large painful varicose veins.\par \par Venous duplex demonstrates bilateral GSV reflux >2 sec and L ASV reflux >2 sec with multiple tributaries [Arterial/Venous Disease] : arterial/venous disease [Foot care/Footwear] : foot care/footwear

## 2022-07-06 ENCOUNTER — RX RENEWAL (OUTPATIENT)
Age: 57
End: 2022-07-06

## 2022-07-07 ENCOUNTER — APPOINTMENT (OUTPATIENT)
Dept: VASCULAR SURGERY | Facility: CLINIC | Age: 57
End: 2022-07-07

## 2022-08-02 ENCOUNTER — APPOINTMENT (OUTPATIENT)
Dept: ORTHOPEDIC SURGERY | Facility: CLINIC | Age: 57
End: 2022-08-02

## 2022-10-11 NOTE — DISCHARGE NOTE PROVIDER - NSDCDCMDCOMP_GEN_ALL_CORE
This document is complete and the patient is ready for discharge. Continue Regimen: Isotretinoin 40mg oral capsule BID Detail Level: Zone Render In Strict Bullet Format?: No

## 2022-10-13 RX ORDER — LEVOTHYROXINE SODIUM 0.05 MG/1
50 TABLET ORAL DAILY
Qty: 90 | Refills: 0 | Status: ACTIVE | COMMUNITY
Start: 2020-11-18 | End: 1900-01-01

## 2022-10-17 RX ORDER — CARVEDILOL 12.5 MG/1
12.5 TABLET, FILM COATED ORAL TWICE DAILY
Qty: 180 | Refills: 0 | Status: ACTIVE | COMMUNITY
Start: 2021-12-22 | End: 1900-01-01

## 2023-07-05 ENCOUNTER — APPOINTMENT (OUTPATIENT)
Dept: MAMMOGRAPHY | Facility: CLINIC | Age: 58
End: 2023-07-05
Payer: COMMERCIAL

## 2023-07-05 ENCOUNTER — APPOINTMENT (OUTPATIENT)
Dept: ULTRASOUND IMAGING | Facility: CLINIC | Age: 58
End: 2023-07-05
Payer: COMMERCIAL

## 2023-07-05 ENCOUNTER — OUTPATIENT (OUTPATIENT)
Dept: OUTPATIENT SERVICES | Facility: HOSPITAL | Age: 58
LOS: 1 days | End: 2023-07-05
Payer: COMMERCIAL

## 2023-07-05 DIAGNOSIS — Z00.8 ENCOUNTER FOR OTHER GENERAL EXAMINATION: ICD-10-CM

## 2023-07-05 DIAGNOSIS — N60.11 DIFFUSE CYSTIC MASTOPATHY OF RIGHT BREAST: ICD-10-CM

## 2023-07-05 PROCEDURE — 77063 BREAST TOMOSYNTHESIS BI: CPT | Mod: 26

## 2023-07-05 PROCEDURE — 76641 ULTRASOUND BREAST COMPLETE: CPT | Mod: 26,50

## 2023-07-05 PROCEDURE — 77067 SCR MAMMO BI INCL CAD: CPT | Mod: 26

## 2023-07-05 PROCEDURE — 77063 BREAST TOMOSYNTHESIS BI: CPT

## 2023-07-05 PROCEDURE — 76641 ULTRASOUND BREAST COMPLETE: CPT

## 2023-07-05 PROCEDURE — 77067 SCR MAMMO BI INCL CAD: CPT

## 2023-07-19 ENCOUNTER — APPOINTMENT (OUTPATIENT)
Dept: COLORECTAL SURGERY | Facility: CLINIC | Age: 58
End: 2023-07-19
Payer: COMMERCIAL

## 2023-07-19 VITALS
HEART RATE: 64 BPM | SYSTOLIC BLOOD PRESSURE: 130 MMHG | DIASTOLIC BLOOD PRESSURE: 70 MMHG | RESPIRATION RATE: 14 BRPM | TEMPERATURE: 97.7 F

## 2023-07-19 VITALS — HEIGHT: 66 IN | BODY MASS INDEX: 39.37 KG/M2 | WEIGHT: 245 LBS

## 2023-07-19 VITALS — BODY MASS INDEX: 40.82 KG/M2 | WEIGHT: 245 LBS | HEIGHT: 65 IN

## 2023-07-19 VITALS — WEIGHT: 240 LBS | BODY MASS INDEX: 39.99 KG/M2 | HEIGHT: 65 IN

## 2023-07-19 DIAGNOSIS — Z86.69 PERSONAL HISTORY OF OTHER DISEASES OF THE NERVOUS SYSTEM AND SENSE ORGANS: ICD-10-CM

## 2023-07-19 DIAGNOSIS — Z12.11 ENCOUNTER FOR SCREENING FOR MALIGNANT NEOPLASM OF COLON: ICD-10-CM

## 2023-07-19 PROCEDURE — 99203 OFFICE O/P NEW LOW 30 MIN: CPT

## 2023-07-19 RX ORDER — GENTAMICIN SULFATE 1 MG/G
0.1 OINTMENT TOPICAL 3 TIMES DAILY
Qty: 1 | Refills: 0 | Status: DISCONTINUED | COMMUNITY
Start: 2022-01-18 | End: 2023-07-19

## 2023-07-19 RX ORDER — DOXYCYCLINE HYCLATE 100 MG/1
100 TABLET ORAL TWICE DAILY
Qty: 10 | Refills: 0 | Status: DISCONTINUED | COMMUNITY
Start: 2022-01-18 | End: 2023-07-19

## 2023-07-19 RX ORDER — COLD-HOT PACK
EACH MISCELLANEOUS
Refills: 0 | Status: ACTIVE | COMMUNITY

## 2023-07-19 RX ORDER — CHOLECALCIFEROL (VITAMIN D3) 25 MCG
TABLET ORAL
Refills: 0 | Status: ACTIVE | COMMUNITY

## 2023-07-19 RX ORDER — MONTELUKAST 10 MG/1
10 TABLET, FILM COATED ORAL
Qty: 90 | Refills: 1 | Status: DISCONTINUED | COMMUNITY
Start: 2021-04-12 | End: 2023-07-19

## 2023-07-19 NOTE — PHYSICAL EXAM
[Normal Breath Sounds] : Normal breath sounds [Normal Heart Sounds] : normal heart sounds [Normal Rate and Rhythm] : normal rate and rhythm [No Edema] : No edema [Alert] : alert [Oriented to Person] : oriented to person [Oriented to Place] : oriented to place [Oriented to Time] : oriented to time [Calm] : calm [de-identified] : obese +BS NT/ND lap scars [de-identified] : NC/AT [de-identified] : +ROM [de-identified] : intact

## 2023-07-19 NOTE — HISTORY OF PRESENT ILLNESS
[FreeTextEntry1] : 59yo obese WF, last colonoscopy 2015 (nl study). Presents for f/u colonoscopy.\par \par Daily BM. Denies abdominal pain, rectal bleeding.

## 2023-07-31 ENCOUNTER — APPOINTMENT (OUTPATIENT)
Dept: COLORECTAL SURGERY | Facility: CLINIC | Age: 58
End: 2023-07-31
Payer: COMMERCIAL

## 2023-07-31 PROCEDURE — 45378 DIAGNOSTIC COLONOSCOPY: CPT

## 2023-09-14 ENCOUNTER — APPOINTMENT (OUTPATIENT)
Dept: OBGYN | Facility: CLINIC | Age: 58
End: 2023-09-14
Payer: COMMERCIAL

## 2023-09-14 VITALS
DIASTOLIC BLOOD PRESSURE: 80 MMHG | WEIGHT: 243 LBS | SYSTOLIC BLOOD PRESSURE: 120 MMHG | BODY MASS INDEX: 39.05 KG/M2 | HEIGHT: 66 IN

## 2023-09-14 DIAGNOSIS — Z01.419 ENCOUNTER FOR GYNECOLOGICAL EXAMINATION (GENERAL) (ROUTINE) W/OUT ABNORMAL FINDINGS: ICD-10-CM

## 2023-09-14 PROCEDURE — 99386 PREV VISIT NEW AGE 40-64: CPT

## 2023-10-02 ENCOUNTER — APPOINTMENT (OUTPATIENT)
Dept: OBGYN | Facility: CLINIC | Age: 58
End: 2023-10-02
Payer: COMMERCIAL

## 2023-10-02 VITALS
RESPIRATION RATE: 16 BRPM | DIASTOLIC BLOOD PRESSURE: 74 MMHG | HEART RATE: 66 BPM | WEIGHT: 246 LBS | SYSTOLIC BLOOD PRESSURE: 118 MMHG | BODY MASS INDEX: 39.53 KG/M2 | HEIGHT: 66 IN

## 2023-10-02 DIAGNOSIS — D25.9 LEIOMYOMA OF UTERUS, UNSPECIFIED: ICD-10-CM

## 2023-10-02 PROCEDURE — 99213 OFFICE O/P EST LOW 20 MIN: CPT

## 2023-10-17 ENCOUNTER — APPOINTMENT (OUTPATIENT)
Dept: ORTHOPEDIC SURGERY | Facility: CLINIC | Age: 58
End: 2023-10-17

## 2023-10-20 LAB
CYTOLOGY CVX/VAG DOC THIN PREP: NORMAL
HPV HIGH+LOW RISK DNA PNL CVX: NOT DETECTED

## 2023-11-01 ENCOUNTER — OFFICE (OUTPATIENT)
Facility: LOCATION | Age: 58
Setting detail: OPHTHALMOLOGY
End: 2023-11-01
Payer: COMMERCIAL

## 2023-11-01 ENCOUNTER — RX ONLY (RX ONLY)
Age: 58
End: 2023-11-01

## 2023-11-01 DIAGNOSIS — H16.223: ICD-10-CM

## 2023-11-01 DIAGNOSIS — H31.29: ICD-10-CM

## 2023-11-01 DIAGNOSIS — H01.005: ICD-10-CM

## 2023-11-01 DIAGNOSIS — H01.004: ICD-10-CM

## 2023-11-01 DIAGNOSIS — H01.002: ICD-10-CM

## 2023-11-01 DIAGNOSIS — H43.813: ICD-10-CM

## 2023-11-01 DIAGNOSIS — H01.001: ICD-10-CM

## 2023-11-01 DIAGNOSIS — H25.13: ICD-10-CM

## 2023-11-01 PROCEDURE — 92014 COMPRE OPH EXAM EST PT 1/>: CPT | Performed by: OPHTHALMOLOGY

## 2023-11-01 ASSESSMENT — TEAR BREAK UP TIME (TBUT)
OS_TBUT: 6 SECONDS
OD_TBUT: 6 SECONDS

## 2023-11-01 ASSESSMENT — LID EXAM ASSESSMENTS
OS_BLEPHARITIS: LLL LUL
OD_BLEPHARITIS: RLL RUL

## 2023-11-01 ASSESSMENT — CONFRONTATIONAL VISUAL FIELD TEST (CVF)
OD_FINDINGS: FULL
OS_FINDINGS: FULL

## 2023-11-02 ASSESSMENT — REFRACTION_CURRENTRX
OD_AXIS: 116
OS_VPRISM_DIRECTION: SV
OS_SPHERE: -2.75
OS_AXIS: 120
OS_OVR_VA: 20/
OD_CYLINDER: +0.25
OD_VPRISM_DIRECTION: SV
OD_SPHERE: -3.00
OD_OVR_VA: 20/
OS_CYLINDER: +0.25

## 2023-11-02 ASSESSMENT — SPHEQUIV_DERIVED
OD_SPHEQUIV: -2.75
OS_SPHEQUIV: -2.875

## 2023-11-02 ASSESSMENT — REFRACTION_AUTOREFRACTION
OS_CYLINDER: +0.75
OD_SPHERE: -3.00
OS_AXIS: 132
OD_CYLINDER: +0.50
OD_AXIS: 041
OS_SPHERE: -3.25

## 2024-04-05 ENCOUNTER — OFFICE (OUTPATIENT)
Facility: LOCATION | Age: 59
Setting detail: OPHTHALMOLOGY
End: 2024-04-05
Payer: COMMERCIAL

## 2024-04-05 DIAGNOSIS — H00.11: ICD-10-CM

## 2024-04-05 PROCEDURE — 99213 OFFICE O/P EST LOW 20 MIN: CPT | Performed by: OPHTHALMOLOGY

## 2024-04-05 ASSESSMENT — LID EXAM ASSESSMENTS
OS_BLEPHARITIS: LLL LUL
OD_BLEPHARITIS: RLL RUL

## 2024-05-02 ENCOUNTER — OFFICE (OUTPATIENT)
Facility: LOCATION | Age: 59
Setting detail: OPHTHALMOLOGY
End: 2024-05-02
Payer: COMMERCIAL

## 2024-05-02 DIAGNOSIS — H43.813: ICD-10-CM

## 2024-05-02 DIAGNOSIS — E11.3293: ICD-10-CM

## 2024-05-02 PROBLEM — H00.11 CHALAZION; RIGHT UPPER LID: Status: ACTIVE | Noted: 2024-04-05

## 2024-05-02 PROCEDURE — 92014 COMPRE OPH EXAM EST PT 1/>: CPT | Performed by: OPHTHALMOLOGY

## 2024-05-02 ASSESSMENT — CONFRONTATIONAL VISUAL FIELD TEST (CVF)
OD_FINDINGS: FULL
OS_FINDINGS: FULL

## 2024-05-02 ASSESSMENT — LID EXAM ASSESSMENTS
OS_BLEPHARITIS: LLL LUL
OD_BLEPHARITIS: RLL RUL

## 2024-05-16 ENCOUNTER — OFFICE (OUTPATIENT)
Facility: LOCATION | Age: 59
Setting detail: OPHTHALMOLOGY
End: 2024-05-16
Payer: COMMERCIAL

## 2024-05-16 ENCOUNTER — RX ONLY (RX ONLY)
Age: 59
End: 2024-05-16

## 2024-05-16 DIAGNOSIS — H01.005: ICD-10-CM

## 2024-05-16 DIAGNOSIS — H01.001: ICD-10-CM

## 2024-05-16 DIAGNOSIS — H01.002: ICD-10-CM

## 2024-05-16 DIAGNOSIS — H00.11: ICD-10-CM

## 2024-05-16 DIAGNOSIS — H01.004: ICD-10-CM

## 2024-05-16 PROCEDURE — 99213 OFFICE O/P EST LOW 20 MIN: CPT | Mod: 25 | Performed by: OPHTHALMOLOGY

## 2024-05-16 PROCEDURE — 11900 INJECT SKIN LESIONS </W 7: CPT | Mod: RT | Performed by: OPHTHALMOLOGY

## 2024-05-16 ASSESSMENT — LID EXAM ASSESSMENTS
OD_BLEPHARITIS: RLL RUL
OS_BLEPHARITIS: LLL LUL

## 2024-05-16 ASSESSMENT — CONFRONTATIONAL VISUAL FIELD TEST (CVF)
OS_FINDINGS: FULL
OD_FINDINGS: FULL

## 2024-05-30 ENCOUNTER — RX ONLY (RX ONLY)
Age: 59
End: 2024-05-30

## 2024-05-30 ENCOUNTER — OFFICE (OUTPATIENT)
Facility: LOCATION | Age: 59
Setting detail: OPHTHALMOLOGY
End: 2024-05-30
Payer: COMMERCIAL

## 2024-05-30 DIAGNOSIS — H01.004: ICD-10-CM

## 2024-05-30 DIAGNOSIS — H00.11: ICD-10-CM

## 2024-05-30 DIAGNOSIS — H01.002: ICD-10-CM

## 2024-05-30 DIAGNOSIS — H01.001: ICD-10-CM

## 2024-05-30 DIAGNOSIS — H01.005: ICD-10-CM

## 2024-05-30 PROBLEM — E11.3293 TYPE 2 DIABETES MELLITUS WITH MILD NONPROLIFERATIVE DIABETIC RETINOPATHY WITHOUT MACULAR EDEMA; BOTH EYES: Status: ACTIVE | Noted: 2024-05-02

## 2024-05-30 PROCEDURE — 99213 OFFICE O/P EST LOW 20 MIN: CPT | Performed by: OPHTHALMOLOGY

## 2024-05-30 ASSESSMENT — CONFRONTATIONAL VISUAL FIELD TEST (CVF)
OS_FINDINGS: FULL
OD_FINDINGS: FULL

## 2024-05-30 ASSESSMENT — LID EXAM ASSESSMENTS
OS_BLEPHARITIS: LLL LUL
OD_BLEPHARITIS: RLL RUL

## 2024-06-04 ENCOUNTER — NON-APPOINTMENT (OUTPATIENT)
Age: 59
End: 2024-06-04

## 2024-06-27 DIAGNOSIS — E03.9 HYPOTHYROIDISM, UNSPECIFIED: ICD-10-CM

## 2024-06-27 DIAGNOSIS — Z00.00 ENCOUNTER FOR GENERAL ADULT MEDICAL EXAMINATION W/OUT ABNORMAL FINDINGS: ICD-10-CM

## 2024-06-27 DIAGNOSIS — I10 ESSENTIAL (PRIMARY) HYPERTENSION: ICD-10-CM

## 2024-06-27 DIAGNOSIS — L40.50 ARTHROPATHIC PSORIASIS, UNSPECIFIED: ICD-10-CM

## 2024-09-05 ENCOUNTER — APPOINTMENT (OUTPATIENT)
Dept: RADIOLOGY | Facility: CLINIC | Age: 59
End: 2024-09-05
Payer: COMMERCIAL

## 2024-09-05 ENCOUNTER — OUTPATIENT (OUTPATIENT)
Dept: OUTPATIENT SERVICES | Facility: HOSPITAL | Age: 59
LOS: 1 days | End: 2024-09-05
Payer: COMMERCIAL

## 2024-09-05 ENCOUNTER — APPOINTMENT (OUTPATIENT)
Dept: ULTRASOUND IMAGING | Facility: CLINIC | Age: 59
End: 2024-09-05
Payer: COMMERCIAL

## 2024-09-05 ENCOUNTER — APPOINTMENT (OUTPATIENT)
Dept: MAMMOGRAPHY | Facility: CLINIC | Age: 59
End: 2024-09-05
Payer: COMMERCIAL

## 2024-09-05 DIAGNOSIS — Z00.8 ENCOUNTER FOR OTHER GENERAL EXAMINATION: ICD-10-CM

## 2024-09-05 PROCEDURE — 77063 BREAST TOMOSYNTHESIS BI: CPT | Mod: 26

## 2024-09-05 PROCEDURE — 77080 DXA BONE DENSITY AXIAL: CPT | Mod: 26

## 2024-09-05 PROCEDURE — 77080 DXA BONE DENSITY AXIAL: CPT

## 2024-09-05 PROCEDURE — 76641 ULTRASOUND BREAST COMPLETE: CPT | Mod: 26,50

## 2024-09-05 PROCEDURE — 77067 SCR MAMMO BI INCL CAD: CPT | Mod: 26

## 2024-09-05 PROCEDURE — 77063 BREAST TOMOSYNTHESIS BI: CPT

## 2024-09-05 PROCEDURE — 77067 SCR MAMMO BI INCL CAD: CPT

## 2024-09-05 PROCEDURE — 76641 ULTRASOUND BREAST COMPLETE: CPT

## 2024-09-19 ENCOUNTER — OFFICE (OUTPATIENT)
Facility: LOCATION | Age: 59
Setting detail: OPHTHALMOLOGY
End: 2024-09-19
Payer: COMMERCIAL

## 2024-09-19 DIAGNOSIS — H01.004: ICD-10-CM

## 2024-09-19 DIAGNOSIS — H10.9: ICD-10-CM

## 2024-09-19 DIAGNOSIS — H01.005: ICD-10-CM

## 2024-09-19 DIAGNOSIS — H01.001: ICD-10-CM

## 2024-09-19 DIAGNOSIS — H01.002: ICD-10-CM

## 2024-09-19 PROCEDURE — 99213 OFFICE O/P EST LOW 20 MIN: CPT | Performed by: OPHTHALMOLOGY

## 2024-09-19 ASSESSMENT — LID EXAM ASSESSMENTS
OD_BLEPHARITIS: RLL RUL
OS_BLEPHARITIS: LLL LUL

## 2024-09-19 ASSESSMENT — CONFRONTATIONAL VISUAL FIELD TEST (CVF)
OD_FINDINGS: FULL
OS_FINDINGS: FULL

## 2024-09-20 PROBLEM — H01.004 BLEPHARITIS; RIGHT UPPER LID, RIGHT LOWER LID, LEFT UPPER LID, LEFT LOWER LID: Status: ACTIVE | Noted: 2024-09-19

## 2024-09-20 PROBLEM — H01.001 BLEPHARITIS; RIGHT UPPER LID, RIGHT LOWER LID, LEFT UPPER LID, LEFT LOWER LID: Status: ACTIVE | Noted: 2024-09-19

## 2024-09-20 PROBLEM — H01.005 BLEPHARITIS; RIGHT UPPER LID, RIGHT LOWER LID, LEFT UPPER LID, LEFT LOWER LID: Status: ACTIVE | Noted: 2024-09-19

## 2024-09-20 PROBLEM — H01.002 BLEPHARITIS; RIGHT UPPER LID, RIGHT LOWER LID, LEFT UPPER LID, LEFT LOWER LID: Status: ACTIVE | Noted: 2024-09-19

## 2024-10-24 ENCOUNTER — OFFICE (OUTPATIENT)
Facility: LOCATION | Age: 59
Setting detail: OPHTHALMOLOGY
End: 2024-10-24
Payer: COMMERCIAL

## 2024-10-24 DIAGNOSIS — H01.001: ICD-10-CM

## 2024-10-24 DIAGNOSIS — H01.004: ICD-10-CM

## 2024-10-24 DIAGNOSIS — H01.005: ICD-10-CM

## 2024-10-24 DIAGNOSIS — H01.002: ICD-10-CM

## 2024-10-24 DIAGNOSIS — H10.9: ICD-10-CM

## 2024-10-24 PROCEDURE — 99213 OFFICE O/P EST LOW 20 MIN: CPT | Performed by: OPHTHALMOLOGY

## 2024-10-24 ASSESSMENT — REFRACTION_CURRENTRX
OD_AXIS: 116
OD_VPRISM_DIRECTION: SV
OS_OVR_VA: 20/
OS_CYLINDER: +0.25
OS_VPRISM_DIRECTION: SV
OS_SPHERE: -2.75
OS_AXIS: 120
OD_CYLINDER: +0.25
OD_OVR_VA: 20/
OD_SPHERE: -3.00

## 2024-10-24 ASSESSMENT — KERATOMETRY
OS_AXISANGLE_DEGREES: 094
OD_K2POWER_DIOPTERS: 42.25
OD_AXISANGLE_DEGREES: 092
OS_K2POWER_DIOPTERS: 42.50
OD_K1POWER_DIOPTERS: 42.00
OS_K1POWER_DIOPTERS: 42.25

## 2024-10-24 ASSESSMENT — CONFRONTATIONAL VISUAL FIELD TEST (CVF)
OD_FINDINGS: FULL
OS_FINDINGS: FULL

## 2024-10-24 ASSESSMENT — REFRACTION_AUTOREFRACTION
OS_SPHERE: -3.25
OD_CYLINDER: +0.50
OS_CYLINDER: +0.75
OD_AXIS: 041
OS_AXIS: 132
OD_SPHERE: -3.00

## 2024-10-24 ASSESSMENT — LID EXAM ASSESSMENTS
OS_BLEPHARITIS: LLL LUL
OD_BLEPHARITIS: RLL RUL

## 2024-10-24 ASSESSMENT — VISUAL ACUITY
OD_BCVA: 20/25
OS_BCVA: 20/25-2

## 2024-10-24 ASSESSMENT — TEAR BREAK UP TIME (TBUT)
OS_TBUT: 6 SECONDS
OD_TBUT: 6 SECONDS

## 2025-02-27 ENCOUNTER — OFFICE (OUTPATIENT)
Facility: LOCATION | Age: 60
Setting detail: OPHTHALMOLOGY
End: 2025-02-27
Payer: COMMERCIAL

## 2025-02-27 DIAGNOSIS — H01.005: ICD-10-CM

## 2025-02-27 DIAGNOSIS — H16.223: ICD-10-CM

## 2025-02-27 DIAGNOSIS — H01.004: ICD-10-CM

## 2025-02-27 DIAGNOSIS — H01.002: ICD-10-CM

## 2025-02-27 DIAGNOSIS — H01.001: ICD-10-CM

## 2025-02-27 PROCEDURE — 99213 OFFICE O/P EST LOW 20 MIN: CPT | Performed by: OPHTHALMOLOGY

## 2025-02-27 ASSESSMENT — SUPERFICIAL PUNCTATE KERATITIS (SPK)
OD_SPK: 1+
OS_SPK: 1+

## 2025-02-27 ASSESSMENT — TEAR BREAK UP TIME (TBUT)
OS_TBUT: 6 SECONDS
OD_TBUT: 6 SECONDS

## 2025-02-27 ASSESSMENT — CONFRONTATIONAL VISUAL FIELD TEST (CVF)
OD_FINDINGS: FULL
OS_FINDINGS: FULL

## 2025-02-27 ASSESSMENT — LID EXAM ASSESSMENTS
OS_BLEPHARITIS: LLL LUL
OD_BLEPHARITIS: RLL RUL

## 2025-02-28 ASSESSMENT — REFRACTION_AUTOREFRACTION
OD_SPHERE: -3.00
OS_AXIS: 132
OD_AXIS: 041
OD_CYLINDER: +0.50
OS_CYLINDER: +0.75
OS_SPHERE: -3.25

## 2025-02-28 ASSESSMENT — REFRACTION_CURRENTRX
OS_OVR_VA: 20/
OD_OVR_VA: 20/
OD_CYLINDER: +0.25
OS_CYLINDER: +0.25
OD_VPRISM_DIRECTION: SV
OD_SPHERE: -3.00
OS_VPRISM_DIRECTION: SV
OS_SPHERE: -2.75
OS_AXIS: 120
OD_AXIS: 116

## 2025-02-28 ASSESSMENT — VISUAL ACUITY
OD_BCVA: 20/25
OS_BCVA: 20/25

## 2025-04-22 ENCOUNTER — NON-APPOINTMENT (OUTPATIENT)
Age: 60
End: 2025-04-22